# Patient Record
Sex: MALE | Race: WHITE | Employment: OTHER | URBAN - METROPOLITAN AREA
[De-identification: names, ages, dates, MRNs, and addresses within clinical notes are randomized per-mention and may not be internally consistent; named-entity substitution may affect disease eponyms.]

---

## 2018-05-08 PROBLEM — K51.00 ULCERATIVE PANCOLITIS WITHOUT COMPLICATION (HCC): Status: ACTIVE | Noted: 2018-05-08

## 2018-05-17 PROBLEM — E66.01 SEVERE OBESITY (BMI 35.0-39.9) WITH COMORBIDITY (HCC): Status: ACTIVE | Noted: 2018-05-17

## 2018-06-13 ENCOUNTER — ANESTHESIA (OUTPATIENT)
Dept: ENDOSCOPY | Age: 68
End: 2018-06-13
Payer: MEDICARE

## 2018-06-13 ENCOUNTER — ANESTHESIA EVENT (OUTPATIENT)
Dept: ENDOSCOPY | Age: 68
End: 2018-06-13
Payer: MEDICARE

## 2018-06-13 ENCOUNTER — HOSPITAL ENCOUNTER (OUTPATIENT)
Age: 68
Setting detail: OUTPATIENT SURGERY
Discharge: HOME | End: 2018-06-13
Attending: INTERNAL MEDICINE | Admitting: INTERNAL MEDICINE
Payer: MEDICARE

## 2018-06-13 VITALS
WEIGHT: 250 LBS | OXYGEN SATURATION: 97 % | BODY MASS INDEX: 35 KG/M2 | HEART RATE: 66 BPM | HEIGHT: 71 IN | TEMPERATURE: 97.8 F | SYSTOLIC BLOOD PRESSURE: 122 MMHG | RESPIRATION RATE: 12 BRPM | DIASTOLIC BLOOD PRESSURE: 73 MMHG

## 2018-06-13 PROCEDURE — 76040000019: Performed by: INTERNAL MEDICINE

## 2018-06-13 PROCEDURE — 88305 TISSUE EXAM BY PATHOLOGIST: CPT | Performed by: INTERNAL MEDICINE

## 2018-06-13 PROCEDURE — 76060000031 HC ANESTHESIA FIRST 0.5 HR: Performed by: INTERNAL MEDICINE

## 2018-06-13 PROCEDURE — 74011250636 HC RX REV CODE- 250/636

## 2018-06-13 PROCEDURE — 77030027957 HC TBNG IRR ENDOGTR BUSS -B: Performed by: INTERNAL MEDICINE

## 2018-06-13 PROCEDURE — 74011250636 HC RX REV CODE- 250/636: Performed by: INTERNAL MEDICINE

## 2018-06-13 PROCEDURE — 77030009426 HC FCPS BIOP ENDOSC BSC -B: Performed by: INTERNAL MEDICINE

## 2018-06-13 RX ORDER — FENTANYL CITRATE 50 UG/ML
100 INJECTION, SOLUTION INTRAMUSCULAR; INTRAVENOUS
Status: DISCONTINUED | OUTPATIENT
Start: 2018-06-13 | End: 2018-06-13 | Stop reason: HOSPADM

## 2018-06-13 RX ORDER — SODIUM CHLORIDE 9 MG/ML
50 INJECTION, SOLUTION INTRAVENOUS CONTINUOUS
Status: DISCONTINUED | OUTPATIENT
Start: 2018-06-13 | End: 2018-06-13 | Stop reason: HOSPADM

## 2018-06-13 RX ORDER — SODIUM CHLORIDE 0.9 % (FLUSH) 0.9 %
5-10 SYRINGE (ML) INJECTION AS NEEDED
Status: DISCONTINUED | OUTPATIENT
Start: 2018-06-13 | End: 2018-06-13 | Stop reason: HOSPADM

## 2018-06-13 RX ORDER — MIDAZOLAM HYDROCHLORIDE 1 MG/ML
.25-5 INJECTION, SOLUTION INTRAMUSCULAR; INTRAVENOUS
Status: DISCONTINUED | OUTPATIENT
Start: 2018-06-13 | End: 2018-06-13 | Stop reason: HOSPADM

## 2018-06-13 RX ORDER — DEXTROMETHORPHAN/PSEUDOEPHED 2.5-7.5/.8
1.2 DROPS ORAL
Status: DISCONTINUED | OUTPATIENT
Start: 2018-06-13 | End: 2018-06-13 | Stop reason: HOSPADM

## 2018-06-13 RX ORDER — EPINEPHRINE 0.1 MG/ML
1 INJECTION INTRACARDIAC; INTRAVENOUS
Status: DISCONTINUED | OUTPATIENT
Start: 2018-06-13 | End: 2018-06-13 | Stop reason: HOSPADM

## 2018-06-13 RX ORDER — ATROPINE SULFATE 0.1 MG/ML
0.5 INJECTION INTRAVENOUS
Status: DISCONTINUED | OUTPATIENT
Start: 2018-06-13 | End: 2018-06-13 | Stop reason: HOSPADM

## 2018-06-13 RX ORDER — FLUMAZENIL 0.1 MG/ML
0.2 INJECTION INTRAVENOUS
Status: DISCONTINUED | OUTPATIENT
Start: 2018-06-13 | End: 2018-06-13 | Stop reason: HOSPADM

## 2018-06-13 RX ORDER — SODIUM CHLORIDE 9 MG/ML
INJECTION, SOLUTION INTRAVENOUS
Status: DISCONTINUED | OUTPATIENT
Start: 2018-06-13 | End: 2018-06-13 | Stop reason: HOSPADM

## 2018-06-13 RX ORDER — NALOXONE HYDROCHLORIDE 0.4 MG/ML
0.4 INJECTION, SOLUTION INTRAMUSCULAR; INTRAVENOUS; SUBCUTANEOUS
Status: DISCONTINUED | OUTPATIENT
Start: 2018-06-13 | End: 2018-06-13 | Stop reason: HOSPADM

## 2018-06-13 RX ORDER — SODIUM CHLORIDE 0.9 % (FLUSH) 0.9 %
5-10 SYRINGE (ML) INJECTION EVERY 8 HOURS
Status: DISCONTINUED | OUTPATIENT
Start: 2018-06-13 | End: 2018-06-13 | Stop reason: HOSPADM

## 2018-06-13 RX ORDER — PROPOFOL 10 MG/ML
INJECTION, EMULSION INTRAVENOUS AS NEEDED
Status: DISCONTINUED | OUTPATIENT
Start: 2018-06-13 | End: 2018-06-13 | Stop reason: HOSPADM

## 2018-06-13 RX ADMIN — PROPOFOL 50 MG: 10 INJECTION, EMULSION INTRAVENOUS at 09:15

## 2018-06-13 RX ADMIN — PROPOFOL 50 MG: 10 INJECTION, EMULSION INTRAVENOUS at 09:09

## 2018-06-13 RX ADMIN — PROPOFOL 50 MG: 10 INJECTION, EMULSION INTRAVENOUS at 09:12

## 2018-06-13 RX ADMIN — PROPOFOL 50 MG: 10 INJECTION, EMULSION INTRAVENOUS at 09:03

## 2018-06-13 RX ADMIN — PROPOFOL 50 MG: 10 INJECTION, EMULSION INTRAVENOUS at 09:02

## 2018-06-13 RX ADMIN — SODIUM CHLORIDE: 9 INJECTION, SOLUTION INTRAVENOUS at 08:51

## 2018-06-13 RX ADMIN — PROPOFOL 50 MG: 10 INJECTION, EMULSION INTRAVENOUS at 09:06

## 2018-06-13 NOTE — ANESTHESIA PREPROCEDURE EVALUATION
Anesthetic History   No history of anesthetic complications            Review of Systems / Medical History  Patient summary reviewed, nursing notes reviewed and pertinent labs reviewed    Pulmonary            Asthma        Neuro/Psych   Within defined limits           Cardiovascular    Hypertension                   GI/Hepatic/Renal  Within defined limits              Endo/Other        Obesity and arthritis     Other Findings              Physical Exam    Airway  Mallampati: II  TM Distance: > 6 cm  Neck ROM: normal range of motion   Mouth opening: Normal     Cardiovascular  Regular rate and rhythm,  S1 and S2 normal,  no murmur, click, rub, or gallop             Dental  No notable dental hx       Pulmonary  Breath sounds clear to auscultation               Abdominal  GI exam deferred       Other Findings            Anesthetic Plan    ASA: 2  Anesthesia type: MAC          Induction: Intravenous  Anesthetic plan and risks discussed with: Patient

## 2018-06-13 NOTE — IP AVS SNAPSHOT
[address] [facility] 13 
[phone] Patient: Anahy Munoz MRN: [id number] :1950 About your hospitalization You were admitted on:  2018 You last received care in the:  [facility] ENDOSCOPY You were discharged on:  2018 Why you were hospitalized Your primary diagnosis was:  Not on File Follow-up Information None Discharge Orders None A check nasrin indicates which time of day the medication should be taken. My Medications CONTINUE taking these medications Instructions Each Dose to Equal  
 Morning Noon Evening Bedtime  
 dorzolamide 2 % ophthalmic solution Commonly known as:  TRUSOPT Your last dose was: Your next dose is:    
   
   
 Administer 1 drop to both eyes two (2) times a day. 1 Drop  
    
   
   
   
  
 hydroCHLOROthiazide 25 mg tablet Commonly known as:  HYDRODIURIL Your last dose was: Your next dose is: TAKE 1 TABLET EVERY DAY  
     
   
   
   
  
 HYDROcodone-acetaminophen 5-325 mg per tablet Commonly known as:  Sam Ribera Your last dose was: Your next dose is: Take 1 Tab by mouth every four (4) hours as needed for Pain. Max Daily Amount: 6 Tabs. 1 Tab POTASSIUM CHLORIDE PO Your last dose was: Your next dose is: Take  by mouth two (2) times a day. Take 20 meq qd SEREVENT DISKUS 50 mcg/dose Dsdv Generic drug:  salmeterol Your last dose was: Your next dose is:    
   
   
 INHALE 1 PUFF TWICE DAILY  
     
   
   
   
  
 sulfaSALAzine 500 mg tablet Commonly known as:  AZULFIDINE Your last dose was: Your next dose is: Take 500 mg by mouth four (4) times daily. 500 mg  
    
   
   
   
  
 XALATAN 0.005 % ophthalmic solution Generic drug:  latanoprost  
   
 Your last dose was: Your next dose is:    
   
   
 Administer 1 Drop to both eyes nightly. 1 Drop Opioid Education Prescription Opioids: What You Need to Know: 
 
Prescription opioids can be used to help relieve moderate-to-severe pain and are often prescribed following a surgery or injury, or for certain health conditions. These medications can be an important part of treatment but also come with serious risks. Opioids are strong pain medicines. Examples include hydrocodone, oxycodone, fentanyl, and morphine. Heroin is an example of an illegal opioid. It is important to work with your health care provider to make sure you are getting the safest, most effective care. WHAT ARE THE RISKS AND SIDE EFFECTS OF OPIOID USE? Prescription opioids carry serious risks of addiction and overdose, especially with prolonged use. An opioid overdose, often marked by slow breathing, can cause sudden death. The use of prescription opioids can have a number of side effects as well, even when taken as directed. · Tolerance-meaning you might need to take more of a medication for the same pain relief · Physical dependence-meaning you have symptoms of withdrawal when the medication is stopped. Withdrawal symptoms can include nausea, sweating, chills, diarrhea, stomach cramps, and muscle aches. Withdrawal can last up to several weeks, depending on which drug you took and how long you took it. · Increased sensitivity to pain · Constipation · Nausea, vomiting, and dry mouth · Sleepiness and dizziness · Confusion · Depression · Low levels of testosterone that can result in lower sex drive, energy, and strength · Itching and sweating RISKS ARE GREATER WITH:      
· History of drug misuse, substance use disorder, or overdose · Mental health conditions (such as depression or anxiety) · Sleep apnea · Older age (72 years or older) · Pregnancy Avoid alcohol while taking prescription opioids. Also, unless specifically advised by your health care provider, medications to avoid include: · Benzodiazepines (such as Xanax or Valium) · Muscle relaxants (such as Soma or Flexeril) · Hypnotics (such as Ambien or Lunesta) · Other prescription opioids KNOW YOUR OPTIONS Talk to your health care provider about ways to manage your pain that don't involve prescription opioids. Some of these options may actually work better and have fewer risks and side effects. Options may include: 
· Pain relievers such as acetaminophen, ibuprofen, and naproxen · Some medications that are also used for depression or seizures · Physical therapy and exercise · Counseling to help patients learn how to cope better with triggers of pain and stress. · Application of heat or cold compress · Massage therapy · Relaxation techniques Be Informed Make sure you know the name of your medication, how much and how often to take it, and its potential risks & side effects. IF YOU ARE PRESCRIBED OPIOIDS FOR PAIN: 
· Never take opioids in greater amounts or more often than prescribed. Remember the goal is not to be pain-free but to manage your pain at a tolerable level. · Follow up with your primary care provider to: · Work together to create a plan on how to manage your pain. · Talk about ways to help manage your pain that don't involve prescription opioids. · Talk about any and all concerns and side effects. · Help prevent misuse and abuse. · Never sell or share prescription opioids · Help prevent misuse and abuse. · Store prescription opioids in a secure place and out of reach of others (this may include visitors, children, friends, and family).  
· Safely dispose of unused/unwanted prescription opioids: Find your community drug take-back program or your pharmacy mail-back program, or flush them down the toilet, following guidance from the Food and Drug Administration (www.fda.gov/Drugs/ResourcesForYou). · Visit www.cdc.gov/drugoverdose to learn about the risks of opioid abuse and overdose. · If you believe you may be struggling with addiction, tell your health care provider and ask for guidance or call Nuzhat Curtis at 8-032-741-PTWP. Discharge Instructions [address] Josie Willett [id number] 
1950 COLON DISCHARGE INSTRUCTIONS DISCOMFORT: 
Redness at IV site- apply warm compress to area; if redness or soreness persist- contact your physician There may be a slight amount of blood passed from the rectum Gaseous discomfort- walking, belching will help relieve any discomfort You may not operate a vehicle for 12 hours You may not engage in an occupation involving machinery or appliances for rest of today You may not drink alcoholic beverages for at least 12 hours Avoid making any critical decisions for at least 24 hour DIET: 
 Regular diet.  however -  remember your colon is empty and a heavy meal will produce gas. Avoid these foods:  vegetables, fried / greasy foods, carbonated drinks for today ACTIVITY: 
You may resume your normal daily activities it is recommended that you spend the remainder of the day resting -  avoid any strenuous activity. CALL M.D. ANY SIGN OF: Increasing pain, nausea, vomiting Abdominal distension (swelling) New increased bleeding (oral or rectal) Fever (chills) Pain in chest area Bloody discharge from nose or mouth Shortness of breath Follow-up Instructions: 
 Call Dr. Mateo Recinos for any questions or problems. Telephone # [phone] Biopsy results will be available in  5 to 7 days Should have a repeat colonoscopy in 3 years Impression:  1.- Ulcerative Colitis, very mild changes Introducing [facility]! [facility] introduces [employer] patient portal. Now you can access parts of your medical record, email your doctor's office, and request medication refills online. 1. In your internet browser, go to [email or web address] 2. Click on the First Time User? Click Here link in the Sign In box. You will see the New Member Sign Up page. 3. Enter your [employer] Access Code exactly as it appears below. You will not need to use this code after youve completed the sign-up process. If you do not sign up before the expiration date, you must request a new code. · [employer] Access Code: [email or web address] Expires: 8/6/2018  9:37 AM 
 
4. Enter the last four digits of your Social Security Number (xxxx) and Date of Birth (mm/dd/yyyy) as indicated and click Submit. You will be taken to the next sign-up page. 5. Create a Dayforce ID. This will be your Dayforce login ID and cannot be changed, so think of one that is secure and easy to remember. 6. Create a [employer]force password. You can change your password at any time. 7. Enter your Password Reset Question and Answer. This can be used at a later time if you forget your password. 8. Enter your e-mail address. You will receive e-mail notification when new information is available in [address] 9. Click Sign Up. You can now view and download portions of your medical record. 10. Click the Download Summary menu link to download a portable copy of your medical information. If you have questions, please visit the Frequently Asked Questions section of the [employer] website. Remember, [employer] is NOT to be used for urgent needs. For medical emergencies, dial 911. Now available from your iPhone and Android! Introducing Austen Sheikh As a [facility] patient, I wanted to make you aware of our electronic visit tool called Austen Sheikh. [facility] 24/7 allows you to connect within minutes with a medical provider 24 hours a day, seven days a week via a mobile device or tablet or logging into a secure website from your computer. You can access [employer] from anywhere in the [place]. A virtual visit might be right for you when you have a simple condition and feel like you just dont want to get out of bed, or cant get away from work for an appointment, when your regular [facility] provider is not available (evenings, weekends or holidays), or when youre out of town and need minor care. Electronic visits cost only $49 and if the [employer] 24/7 provider determines a prescription is needed to treat your condition, one can be electronically transmitted to a nearby pharmacy*. Please take a moment to enroll today if you have not already done so. The enrollment process is free and takes just a few minutes. To enroll, please download the [employer] 24/7 lilibeth to your tablet or phone, or visit [email or web address] to enroll on your computer. And, as an [address] [place] [address] patient with a [employer] account, the results of your visits will be scanned into your electronic medical record and your primary care provider will be able to view the scanned results. We urge you to continue to see your regular [facility] provider for your ongoing medical care. And while your primary care provider may not be the one available when you seek a [employer] virtual visit, the peace of mind you get from getting a real diagnosis real time can be priceless. For more information on [employer], view our Frequently Asked Questions (FAQs) at [email or web address]. Sincerely, 
 
Vero Otero MD 
Chief Medical Officer Morelia Lorelei Garcia *:  certain medications cannot be prescribed via [employer] Providers Seen During Your Hospitalization Provider Specialty Primary office phone Yeyo Diaz MD Gastroenterology [phone] Your Primary Care Physician (PCP) Primary Care Physician Office Phone Office Fax Larry Shaw [phone] You are allergic to the following Allergen Reactions Adhesive Rash Cephalexin Unknown (comments) Unsure of reaction Pcn (Penicillins) Rash Zithromax (Azithromycin) Rash Recent Documentation Height Weight BMI Smoking Status 1.791 m 113.4 kg 35.36 kg/m2 Never Smoker Emergency Contacts Name Discharge Info Relation Home Work Mobile Olman Perrin CAREGIVER [3] Spouse [3] [phone] Patient Belongings The following personal items are in your possession at time of discharge: 
  Dental Appliances: None  Visual Aid: None Please provide this summary of care documentation to your next provider. Signatures-by signing, you are acknowledging that this After Visit Summary has been reviewed with you and you have received a copy. Patient Signature:  ____________________________________________________________ Date:  ____________________________________________________________  
  
Janicea Monticello Provider Signature:  ____________________________________________________________ Date:  ____________________________________________________________

## 2018-06-13 NOTE — ROUTINE PROCESS
Edil Shall  1950  [id number]    Situation:  Verbal report received from: Ashlee Dumont  Procedure: Procedure(s):  COLONOSCOPY  COLON BIOPSY    Background:    Preoperative diagnosis: FAMILY HISTORY OF COLON CANCER AND PERSONAL HISTORY OF COLON POLYPS  Postoperative diagnosis: 1.- Ulcerative Colitis    :  Dr. Maximo Messina  Assistant(s): Endoscopy Technician-1: Stanislav Fontanez  Endoscopy RN-1: Radha Cisse RN    Specimens:   ID Type Source Tests Collected by Time Destination   1 : Cecal Ascending Colon Biopsy Preservative   Kendell Tavares MD 6/13/2018 0914 Pathology   2 : Hepatic Transverse Colon Biopsy Pressamirative   Kendell Tavares MD 6/13/2018 0507 Pathology   3 : Splenic Descending Colon Biopsy Pressamirative   Kendell Tavares MD 6/13/2018 0620 Pathology   4 : Sigmoid Rectal Biopsy Preservative   Kendell Tavares MD 6/13/2018 0919 Pathology     H. Pylori  no    Assessment:  Intra-procedure medications     Anesthesia gave intra-procedure sedation and medications, see anesthesia flow sheet yes    Intravenous fluids: NS@ KVO     Vital signs stable     Abdominal assessment: round and soft     Recommendation:  Discharge patient per MD order.     Family or Friend   Permission to share finding with family or friend yes

## 2018-06-13 NOTE — ANESTHESIA POSTPROCEDURE EVALUATION
Post-Anesthesia Evaluation and Assessment    Patient: Ramona Reyse MRN: [id number]  SSN: [id number]    YOB: 1950  Age: 77 y.o. Sex: male       Cardiovascular Function/Vital Signs  Visit Vitals    /73    Pulse 66    Temp 36.6 °C (97.8 °F)    Resp 12    Ht 5' 10.5\" (1.791 m)    Wt 113.4 kg (250 lb)    SpO2 97%    BMI 35.36 kg/m2       Patient is status post MAC anesthesia for Procedure(s):  COLONOSCOPY  COLON BIOPSY. Nausea/Vomiting: None    Postoperative hydration reviewed and adequate. Pain:  Pain Scale 1: Numeric (0 - 10) (06/13/18 0952)  Pain Intensity 1: 0 (06/13/18 9224)   Managed    Neurological Status: At baseline    Mental Status and Level of Consciousness: Arousable    Pulmonary Status:   O2 Device: Room air (06/13/18 0952)   Adequate oxygenation and airway patent    Complications related to anesthesia: None    Post-anesthesia assessment completed.  No concerns    Signed By: Nhi Manriquez MD     June 13, 2018

## 2018-06-13 NOTE — DISCHARGE INSTRUCTIONS
[address]  [facility], [address]          [facility]  [id number]  1950    COLON DISCHARGE INSTRUCTIONS    DISCOMFORT:  Redness at IV site- apply warm compress to area; if redness or soreness persist- contact your physician  There may be a slight amount of blood passed from the rectum  Gaseous discomfort- walking, belching will help relieve any discomfort  You may not operate a vehicle for 12 hours  You may not engage in an occupation involving machinery or appliances for rest of today  You may not drink alcoholic beverages for at least 12 hours  Avoid making any critical decisions for at least 24 hour  DIET:   Regular diet. - however -  remember your colon is empty and a heavy meal will produce gas. Avoid these foods:  vegetables, fried / greasy foods, carbonated drinks for today         ACTIVITY:  You may resume your normal daily activities it is recommended that you spend the remainder of the day resting -  avoid any strenuous activity. CALL M.D. ANY SIGN OF:   Increasing pain, nausea, vomiting  Abdominal distension (swelling)  New increased bleeding (oral or rectal)  Fever (chills)  Pain in chest area  Bloody discharge from nose or mouth  Shortness of breath     Follow-up Instructions:   Call Dr. Saeed Douglass for any questions or problems.    Telephone # [phone]  Biopsy results will be available in  5 to 7 days  Should have a repeat colonoscopy in 3 years    Impression:  1.- Ulcerative Colitis, very mild changes

## 2018-06-13 NOTE — PROGRESS NOTES

## 2018-06-13 NOTE — H&P
[address]                 Payal Reddy is a  77 y.o.  male who presents with long standing ulcerative colitis for screening .         Past Medical History:   Diagnosis Date    Arthritis     Asthma     Basal cell carcinoma of back     Cancer (HCC)     SKIN CANCERS-BASAL    Chronic pain     back    Hypertension     Other ill-defined conditions(799.89)     ulcerative cholitis    Other ill-defined conditions(799.89)     ruptured l3-4    Other ill-defined conditions(799.89)     spinal stensis    Ulcerative pancolitis without complication ([address] 8/6/4366     Past Surgical History:   Procedure Laterality Date    ABDOMEN SURGERY PROC UNLISTED  1983    hernia repair    HX COLONOSCOPY      HX HEENT      TONSILS AS A CHILD    HX HEENT      CATARACTS    HX ORTHOPAEDIC  4/70    fussion- l45    HX ORTHOPAEDIC  2015    rods placed [facility] ortho     Allergies   Allergen Reactions    Adhesive Rash    Cephalexin Unknown (comments)     Unsure of reaction    Pcn [Penicillins] Rash    Zithromax [Azithromycin] Rash     Current Facility-Administered Medications   Medication Dose Route Frequency Provider Last Rate Last Dose    0.9% sodium chloride infusion  50 mL/hr IntraVENous CONTINUOUS Lissa Flores MD        sodium chloride (NS) flush 5-10 mL  5-10 mL IntraVENous Q8H Lissa Flores MD        sodium chloride (NS) flush 5-10 mL  5-10 mL IntraVENous PRN Lissa Flores MD        midazolam (VERSED) injection 0.25-5 mg  0.25-5 mg IntraVENous Simi Flores MD        fentaNYL citrate (PF) injection 100 mcg  100 mcg IntraVENous Simi Flores MD        naloxone [facility]) injection 0.4 mg  0.4 mg IntraVENous Simi Flores MD        flumazenil (ROMAZICON) 0.1 mg/mL injection 0.2 mg  0.2 mg IntraVENous Simi Flores MD        [facility]) 60LR/6.0XZ oral drops 80 mg  1.2 mL Oral Multiple Jessenia Dolan MD        atropine injection 0.5 mg  0.5 mg IntraVENous ONCE PRN Jessenia Dolan MD        EPINEPHrine (ADRENALIN) 0.1 mg/mL syringe 1 mg  1 mg Endoscopically ONCE PRN Jessenia Dolan MD         Facility-Administered Medications Ordered in Other Encounters   Medication Dose Route Frequency Provider Last Rate Last Dose    0.9% sodium chloride infusion   IntraVENous CONTINUOUS Maybelle Late, CRNA           Visit Vitals    /90    Pulse 90    Temp 98.3 °F (36.8 °C)    Resp 15    Ht 5' 10.5\" (1.791 m)    Wt 113.4 kg (250 lb)    SpO2 96%    BMI 35.36 kg/m2           PHYSICAL EXAM:  General: WD, WN. Alert, cooperative, no acute distress    HEENT: NC, Atraumatic. PERRLA, EOMI. Anicteric sclerae. Mallampati score 2  Lungs:  CTA Bilaterally. No Wheezing/Rhonchi/Rales. Heart:  Regular  rhythm,  No murmur (), No Rubs, No Gallops  Abdomen: Soft, Non distended, Non tender.  +Bowel sounds, no HSM  Extremities: No c/c/e  Neurologic:  CN 2-12 gi, Alert and oriented X 3. No acute neurological distress   Psych:   Good insight. Not anxious nor agitated. Plan:   Endoscopic procedure with MAC.     Jessenia Dolan MD  6/13/2018  9:02 AM

## 2018-10-25 ENCOUNTER — HOSPITAL ENCOUNTER (OUTPATIENT)
Dept: GENERAL RADIOLOGY | Age: 68
Discharge: HOME | End: 2018-10-25
Attending: FAMILY MEDICINE
Payer: MEDICARE

## 2018-10-25 DIAGNOSIS — R07.9 RIGHT-SIDED CHEST PAIN: ICD-10-CM

## 2018-10-25 PROCEDURE — 71046 X-RAY EXAM CHEST 2 VIEWS: CPT

## 2019-01-09 ENCOUNTER — APPOINTMENT (OUTPATIENT)
Dept: GENERAL RADIOLOGY | Age: 69
End: 2019-01-09
Attending: PHYSICIAN ASSISTANT
Payer: MEDICARE

## 2019-01-09 ENCOUNTER — HOSPITAL ENCOUNTER (EMERGENCY)
Age: 69
Discharge: HOME | End: 2019-01-09
Attending: EMERGENCY MEDICINE
Payer: MEDICARE

## 2019-01-09 ENCOUNTER — APPOINTMENT (OUTPATIENT)
Dept: CT IMAGING | Age: 69
End: 2019-01-09
Attending: PHYSICIAN ASSISTANT
Payer: MEDICARE

## 2019-01-09 VITALS
OXYGEN SATURATION: 96 % | TEMPERATURE: 98.4 F | DIASTOLIC BLOOD PRESSURE: 95 MMHG | HEART RATE: 76 BPM | SYSTOLIC BLOOD PRESSURE: 168 MMHG | RESPIRATION RATE: 18 BRPM

## 2019-01-09 DIAGNOSIS — S50.01XA CONTUSION OF RIGHT ELBOW, INITIAL ENCOUNTER: ICD-10-CM
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
## 2019-01-09 DIAGNOSIS — S20.211A CONTUSION OF RIB ON RIGHT SIDE, INITIAL ENCOUNTER: ICD-10-CM

## 2019-01-09 DIAGNOSIS — S80.01XA CONTUSION OF RIGHT KNEE, INITIAL ENCOUNTER: ICD-10-CM

## 2019-01-09 PROCEDURE — 71101 X-RAY EXAM UNILAT RIBS/CHEST: CPT

## 2019-01-09 PROCEDURE — 71250 CT THORAX DX C-: CPT

## 2019-01-09 PROCEDURE — 74011000250 HC RX REV CODE- 250: Performed by: PHYSICIAN ASSISTANT

## 2019-01-09 PROCEDURE — 73080 X-RAY EXAM OF ELBOW: CPT

## 2019-01-09 PROCEDURE — 73562 X-RAY EXAM OF KNEE 3: CPT

## 2019-01-09 PROCEDURE — 99283 EMERGENCY DEPT VISIT LOW MDM: CPT

## 2019-01-09 RX ORDER — LIDOCAINE 50 MG/G
1 PATCH TOPICAL EVERY 24 HOURS
Status: DISCONTINUED | OUTPATIENT
Start: 2019-01-09 | End: 2019-01-09 | Stop reason: HOSPADM

## 2019-01-09 NOTE — SENIOR SERVICES NOTE
Physical Therapy Screening: 
Physical Therapy consult is not indicated at this time. A screening was performed on this patient's chart based on their entrance into the emergency department and potential need for physical therapy identified. The patients chart was reviewed and the patient was interviewed/screened. A physical therapy consult is not indicated at this time based on their prior level of function and current medical status. Please consult physical therapy if any therapy needs arise. Thank you.

## 2019-01-09 NOTE — DISCHARGE INSTRUCTIONS
Patient Education      - return for new or worsening symptoms  - make a follow up with your primary care  - over the counter medications as needed for pain  Bruises: Care Instructions  Your Care Instructions    Bruises occur when small blood vessels under the skin tear or rupture, most often from a twist, bump, or fall. Blood leaks into tissues under the skin and causes a black-and-blue spot that often turns colors, including purplish black, reddish blue, or yellowish green, as the bruise heals. Bruises hurt, but most are not serious and will go away on their own within 2 to 4 weeks. Sometimes, gravity causes them to spread down the body. A leg bruise usually will take longer to heal than a bruise on the face or arms. Follow-up care is a key part of your treatment and safety. Be sure to make and go to all appointments, and call your doctor if you are having problems. It's also a good idea to know your test results and keep a list of the medicines you take. How can you care for yourself at home? · Take pain medicines exactly as directed. ? If the doctor gave you a prescription medicine for pain, take it as prescribed. ? If you are not taking a prescription pain medicine, ask your doctor if you can take an over-the-counter medicine. · Put ice or a cold pack on the area for 10 to 20 minutes at a time. Put a thin cloth between the ice and your skin. · If you can, prop up the bruised area on pillows as much as possible for the next few days. Try to keep the bruise above the level of your heart. When should you call for help? Call your doctor now or seek immediate medical care if:    · You have signs of infection, such as:  ? Increased pain, swelling, warmth, or redness. ? Red streaks leading from the bruise. ? Pus draining from the bruise. ?  A fever.     · You have a bruise on your leg and signs of a blood clot, such as:  ? Increasing redness and swelling along with warmth, tenderness, and pain in the bruised area. ? Pain in your calf, back of the knee, thigh, or groin. ? Redness and swelling in your leg or groin.     · Your pain gets worse.    Watch closely for changes in your health, and be sure to contact your doctor if:    · You do not get better as expected. Where can you learn more? Go to http://montez-joseph.info/. Enter [phone] in the search box to learn more about \"Bruises: Care Instructions. \"  Current as of: November 20, 2017  Content Version: 11.8  © 8116-8196 [employer]. Care instructions adapted under license by [employer] (which disclaims liability or warranty for this information). If you have questions about a medical condition or this instruction, always ask your healthcare professional. [place] [address] any warranty or liability for your use of this information. Patient Education        Chest Contusion: Care Instructions  Your Care Instructions  A chest contusion, or bruise, is caused by a fall or direct blow to the chest. Car crashes, falls, getting punched, and injury from bicycle handlebars are common causes of chest contusions. A very forceful blow to the chest can injure the heart or blood vessels in the chest, the lungs, the airway, the liver, or the spleen. Pain may be caused by an injury to muscles, cartilage, or ribs. Deep breathing, coughing, or sneezing can increase your pain. Lying on the injured area also can cause pain. Follow-up care is a key part of your treatment and safety. Be sure to make and go to all appointments, and call your doctor if you are having problems. It's also a good idea to know your test results and keep a list of the medicines you take. How can you care for yourself at home? · Rest and protect the injured or sore area. Stop, change, or take a break from any activity that may be causing your pain. · Put ice or a cold pack on the area for 10 to 20 minutes at a time.  Put a thin cloth between the ice and your skin. · After 2 or 3 days, if your swelling is gone, apply a heating pad set on low or a warm cloth to your chest. Some doctors suggest that you go back and forth between hot and cold. Put a thin cloth between the heating pad and your skin. · Do not wrap or tape your ribs for support. This may cause you to take smaller breaths, which could increase your risk of pneumonia and lung collapse. · Ask your doctor if you can take an over-the-counter pain medicine, such as acetaminophen (Tylenol), ibuprofen (Advil, Motrin), or naproxen (Aleve). Be safe with medicines. Read and follow all instructions on the label. · Take your medicines exactly as prescribed. Call your doctor if you think you are having a problem with your medicine. · Gentle stretching and massage may help you feel better after a few days of rest. Stretch slowly to the point just before discomfort begins, then hold the stretch for at least 15 to 30 seconds. Do this 3 or 4 times per day. · As your pain gets better, slowly return to your normal activities. Be patient, because chest bruises can take weeks or months to heal. Any increased pain may be a sign that you need to rest a while longer. When should you call for help? Call 911 anytime you think you may need emergency care. For example, call if:    · You have severe trouble breathing.     · You cough up blood.    Call your doctor now or seek immediate medical care if:    · You have belly pain.     · You are dizzy or lightheaded, or you feel like you may faint.     · You develop new symptoms with the chest pain.     · Your chest pain gets worse.     · You have a fever.     · You have some shortness of breath.     · You have a cough that brings up mucus from the lungs.    Watch closely for changes in your health, and be sure to contact your doctor if:    · Your chest pain is not improving after 1 week. Where can you learn more?   Go to http://montez-joseph.info/. Enter I174 in the search box to learn more about \"Chest Contusion: Care Instructions. \"  Current as of: November 20, 2017  Content Version: 11.8  © 7624-2378 [employer]. Care instructions adapted under license by [employer] (which disclaims liability or warranty for this information). If you have questions about a medical condition or this instruction, always ask your healthcare professional. [place] [address] any warranty or liability for your use of this information.

## 2019-01-09 NOTE — ED PROVIDER NOTES
77year old male presenting to the ED for a fall. Pt notes that the was outside, tripped over a root and fell onto his RIGHT side. Fall occurred at about 11:30AM.  Pt notes that he skinned his knee, his right elbow, and right ribs. Notes that the most pain is in the right ribs, just under the right nippl, sharp, moderately severe, worse with movement and palpation. Denies abdominal pain. Pt notes that it hurts to take a deep breath. No head trauma or LOC. No anticoagulant use. PMHx: asthma, HTN, UC Social:  Past Medical History:  
Diagnosis Date  Arthritis  Asthma  Basal cell carcinoma of back  Cancer ([address] SKIN CANCERS-BASAL  Chronic pain   
 back  Hypertension  Other ill-defined conditions(799.89)   
 ulcerative cholitis  Other ill-defined conditions(799.89) ruptured l3-4  Other ill-defined conditions(799.89)   
 spinal stensis  Ulcerative pancolitis without complication ([address] 0/2/8456 Past Surgical History:  
Procedure Laterality Date  ABDOMEN SURGERY PROC UNLISTED  1983  
 hernia repair  COLONOSCOPY N/A 6/13/2018 COLONOSCOPY performed by Mary Lou Blunt MD at [facility] HX COLONOSCOPY    
 HX HEENT    
 TONSILS AS A CHILD  
 HX HEENT    
 CATARACTS  
 HX ORTHOPAEDIC  4/70  
 fussion- Radonna Tutu  HX ORTHOPAEDIC  2015  
 rods placed [facility] ortho Family History:  
Problem Relation Age of Onset  Hypertension Mother  Heart Disease Mother CHF  Stroke Mother  Inflammatory Bowel Dz Mother  Osteoporosis Mother  Hypertension Father  Parkinsonism Father  Asthma Brother  Hypertension Brother  Psychiatric Disorder Brother ANXIETY  Asthma Brother  Cancer Paternal Grandfather   
     colon  Cancer Other   
     stomach cancer - mother's grandfather  Anesth Problems Neg Hx Social History Socioeconomic History  Marital status:   
 Spouse name: Not on file  Number of children: Not on file  Years of education: Not on file  Highest education level: Not on file Social Needs  Financial resource strain: Not on file  Food insecurity - worry: Not on file  Food insecurity - inability: Not on file  Transportation needs - medical: Not on file  Transportation needs - non-medical: Not on file Occupational History  Not on file Tobacco Use  Smoking status: Never Smoker  Smokeless tobacco: Never Used Substance and Sexual Activity  Alcohol use: No  
 Drug use: No  
 Sexual activity: Not on file Other Topics Concern  Not on file Social History Narrative  Not on file ALLERGIES: Adhesive; Cephalexin; Pcn [penicillins]; and Zithromax [azithromycin] Review of Systems Constitutional: Negative for fever. HENT: Negative for congestion. Eyes: Negative for discharge and redness. Respiratory: Negative for cough and shortness of breath. Cardiovascular: Positive for chest pain (chest wall). Gastrointestinal: Negative for diarrhea and vomiting. Genitourinary: Negative for difficulty urinating. Musculoskeletal: Positive for arthralgias. Skin: Positive for wound (abrasions). Neurological: Negative for syncope. All other systems reviewed and are negative. Vitals:  
 01/09/19 1224 Pulse: 87 SpO2: 98% Physical Exam  
Constitutional: He appears well-developed and well-nourished. No distress. Pleasant WM  
HENT:  
Right Ear: External ear normal.  
Left Ear: External ear normal.  
Eyes: Pupils are equal, round, and reactive to light. Neck: Normal range of motion. Neck supple. Cardiovascular: Normal rate, regular rhythm and normal heart sounds. Exam reveals no gallop and no friction rub. No murmur heard. Pulmonary/Chest: Effort normal and breath sounds normal. No respiratory distress. He has no wheezes. Abdominal: Soft. There is no tenderness. There is no guarding. Abdomen benign Musculoskeletal: Normal range of motion. Abrasions to the right elbow and right knee, normal ROM, NVID Neurological: He is alert. Skin: Skin is warm and dry. Psychiatric: He has a normal mood and affect. Nursing note and vitals reviewed. MDM Number of Diagnoses or Management Options Diagnosis management comments: 77year old male presenting for right rib, elbow, knee pain after mechanical fall. No head trauma or LOC. No fx on XR or CT. Encouraged supportive care at home, PCP f/u. Amount and/or Complexity of Data Reviewed Tests in the radiology section of CPT®: ordered and reviewed Discuss the patient with other providers: yes (Dr. Sotero Sandoval, ED attending) Procedures

## 2019-01-09 NOTE — ED TRIAGE NOTES
C/o tripping over root and fell onto right side. C/o Right flank pain, right elbow pain and right knee pain. Painful breathing. Abrasion to Right posterior forearm

## 2020-09-29 ENCOUNTER — APPOINTMENT (OUTPATIENT)
Dept: CT IMAGING | Age: 70
End: 2020-09-29
Attending: EMERGENCY MEDICINE
Payer: MEDICARE

## 2020-09-29 ENCOUNTER — HOSPITAL ENCOUNTER (EMERGENCY)
Age: 70
Discharge: HOME | End: 2020-09-29
Attending: EMERGENCY MEDICINE
Payer: MEDICARE

## 2020-09-29 VITALS
RESPIRATION RATE: 20 BRPM | SYSTOLIC BLOOD PRESSURE: 156 MMHG | OXYGEN SATURATION: 97 % | DIASTOLIC BLOOD PRESSURE: 71 MMHG | HEIGHT: 71 IN | HEART RATE: 92 BPM | BODY MASS INDEX: 35 KG/M2 | TEMPERATURE: 97.2 F | WEIGHT: 250 LBS

## 2020-09-29 DIAGNOSIS — K08.89 PAIN, DENTAL: ICD-10-CM

## 2020-09-29 DIAGNOSIS — R22.0 RIGHT FACIAL SWELLING: Primary | ICD-10-CM

## 2020-09-29 LAB
ALBUMIN SERPL-MCNC: 3.5 G/DL (ref 3.5–5)
ALBUMIN/GLOB SERPL: 0.6 {RATIO} (ref 1.1–2.2)
ALP SERPL-CCNC: 65 U/L (ref 45–117)
ALT SERPL-CCNC: 54 U/L (ref 12–78)
ANION GAP SERPL CALC-SCNC: 7 MMOL/L (ref 5–15)
AST SERPL-CCNC: 49 U/L (ref 15–37)
BASOPHILS # BLD: 0.1 K/UL (ref 0–0.1)
BASOPHILS NFR BLD: 1 % (ref 0–1)
BILIRUB SERPL-MCNC: 0.4 MG/DL (ref 0.2–1)
BUN SERPL-MCNC: 17 MG/DL (ref 6–20)
BUN/CREAT SERPL: 15 (ref 12–20)
CALCIUM SERPL-MCNC: 9.9 MG/DL (ref 8.5–10.1)
CHLORIDE SERPL-SCNC: 98 MMOL/L (ref 97–108)
CO2 SERPL-SCNC: 26 MMOL/L (ref 21–32)
CREAT SERPL-MCNC: 1.11 MG/DL (ref 0.7–1.3)
DIFFERENTIAL METHOD BLD: NORMAL
EOSINOPHIL # BLD: 0.1 K/UL (ref 0–0.4)
EOSINOPHIL NFR BLD: 1 % (ref 0–7)
ERYTHROCYTE [DISTWIDTH] IN BLOOD BY AUTOMATED COUNT: 11.9 % (ref 11.5–14.5)
GLOBULIN SER CALC-MCNC: 5.4 G/DL (ref 2–4)
GLUCOSE SERPL-MCNC: 247 MG/DL (ref 65–100)
HCT VFR BLD AUTO: 40.8 % (ref 36.6–50.3)
HGB BLD-MCNC: 13.9 G/DL (ref 12.1–17)
IMM GRANULOCYTES # BLD AUTO: 0 K/UL (ref 0–0.04)
IMM GRANULOCYTES NFR BLD AUTO: 0 % (ref 0–0.5)
LYMPHOCYTES # BLD: 2.3 K/UL (ref 0.8–3.5)
LYMPHOCYTES NFR BLD: 25 % (ref 12–49)
MCH RBC QN AUTO: 32 PG (ref 26–34)
MCHC RBC AUTO-ENTMCNC: 34.1 G/DL (ref 30–36.5)
MCV RBC AUTO: 93.8 FL (ref 80–99)
MONOCYTES # BLD: 0.7 K/UL (ref 0–1)
MONOCYTES NFR BLD: 8 % (ref 5–13)
NEUTS SEG # BLD: 6 K/UL (ref 1.8–8)
NEUTS SEG NFR BLD: 65 % (ref 32–75)
NRBC # BLD: 0 K/UL (ref 0–0.01)
NRBC BLD-RTO: 0 PER 100 WBC
PLATELET # BLD AUTO: 259 K/UL (ref 150–400)
PMV BLD AUTO: 9.9 FL (ref 8.9–12.9)
POTASSIUM SERPL-SCNC: 3.8 MMOL/L (ref 3.5–5.1)
PROT SERPL-MCNC: 8.9 G/DL (ref 6.4–8.2)
RBC # BLD AUTO: 4.35 M/UL (ref 4.1–5.7)
SODIUM SERPL-SCNC: 131 MMOL/L (ref 136–145)
WBC # BLD AUTO: 9.2 K/UL (ref 4.1–11.1)

## 2020-09-29 PROCEDURE — 74011000636 HC RX REV CODE- 636: Performed by: RADIOLOGY

## 2020-09-29 PROCEDURE — 96374 THER/PROPH/DIAG INJ IV PUSH: CPT

## 2020-09-29 PROCEDURE — 96375 TX/PRO/DX INJ NEW DRUG ADDON: CPT

## 2020-09-29 PROCEDURE — 80053 COMPREHEN METABOLIC PANEL: CPT

## 2020-09-29 PROCEDURE — 99282 EMERGENCY DEPT VISIT SF MDM: CPT

## 2020-09-29 PROCEDURE — 85025 COMPLETE CBC W/AUTO DIFF WBC: CPT

## 2020-09-29 PROCEDURE — 74011000258 HC RX REV CODE- 258: Performed by: RADIOLOGY

## 2020-09-29 PROCEDURE — 74011250636 HC RX REV CODE- 250/636: Performed by: EMERGENCY MEDICINE

## 2020-09-29 PROCEDURE — 74011250637 HC RX REV CODE- 250/637: Performed by: EMERGENCY MEDICINE

## 2020-09-29 PROCEDURE — 74011000250 HC RX REV CODE- 250: Performed by: EMERGENCY MEDICINE

## 2020-09-29 PROCEDURE — 36415 COLL VENOUS BLD VENIPUNCTURE: CPT

## 2020-09-29 PROCEDURE — 70487 CT MAXILLOFACIAL W/DYE: CPT

## 2020-09-29 RX ORDER — DEXAMETHASONE SODIUM PHOSPHATE 10 MG/ML
10 INJECTION INTRAMUSCULAR; INTRAVENOUS
Status: COMPLETED | OUTPATIENT
Start: 2020-09-29 | End: 2020-09-29

## 2020-09-29 RX ORDER — ONDANSETRON 2 MG/ML
4 INJECTION INTRAMUSCULAR; INTRAVENOUS
Status: COMPLETED | OUTPATIENT
Start: 2020-09-29 | End: 2020-09-29

## 2020-09-29 RX ORDER — KETOROLAC TROMETHAMINE 30 MG/ML
15 INJECTION, SOLUTION INTRAMUSCULAR; INTRAVENOUS
Status: COMPLETED | OUTPATIENT
Start: 2020-09-29 | End: 2020-09-29

## 2020-09-29 RX ORDER — CLINDAMYCIN HYDROCHLORIDE 300 MG/1
300 CAPSULE ORAL 4 TIMES DAILY
Qty: 28 CAP | Refills: 0 | Status: SHIPPED | OUTPATIENT
Start: 2020-09-29 | End: 2020-10-06

## 2020-09-29 RX ORDER — CLINDAMYCIN HYDROCHLORIDE 150 MG/1
600 CAPSULE ORAL
Status: COMPLETED | OUTPATIENT
Start: 2020-09-29 | End: 2020-09-29

## 2020-09-29 RX ORDER — SODIUM CHLORIDE 0.9 % (FLUSH) 0.9 %
10 SYRINGE (ML) INJECTION
Status: COMPLETED | OUTPATIENT
Start: 2020-09-29 | End: 2020-09-29

## 2020-09-29 RX ADMIN — DEXAMETHASONE SODIUM PHOSPHATE 10 MG: 10 INJECTION, SOLUTION INTRAMUSCULAR; INTRAVENOUS at 13:10

## 2020-09-29 RX ADMIN — KETOROLAC TROMETHAMINE 15 MG: 30 INJECTION, SOLUTION INTRAMUSCULAR at 13:10

## 2020-09-29 RX ADMIN — CLINDAMYCIN HYDROCHLORIDE 600 MG: 150 CAPSULE ORAL at 13:09

## 2020-09-29 RX ADMIN — ONDANSETRON 4 MG: 2 INJECTION INTRAMUSCULAR; INTRAVENOUS at 13:11

## 2020-09-29 RX ADMIN — Medication 10 ML: at 14:13

## 2020-09-29 RX ADMIN — SODIUM CHLORIDE 100 ML: 900 INJECTION, SOLUTION INTRAVENOUS at 14:13

## 2020-09-29 RX ADMIN — LIDOCAINE HYDROCHLORIDE: 20 SOLUTION ORAL; TOPICAL at 13:09

## 2020-09-29 RX ADMIN — IOPAMIDOL 100 ML: 612 INJECTION, SOLUTION INTRAVENOUS at 14:13

## 2020-09-29 NOTE — DISCHARGE INSTRUCTIONS
Patient Education        Tooth and Gum Pain: Care Instructions  Your Care Instructions    The most common causes of dental pain are tooth decay and gum disease. Pain can also be caused by an infection of the tooth (abscess) or the gums. Or you may have pain from a broken or cracked tooth. Other causes of pain include infection and damage to a tooth from nervous grinding of your teeth. A wisdom tooth can be painful when it is coming in but cannot break through the gum. It can also be painful when the tooth is only partway in and extra gum tissue has formed around it. The tissue can get inflamed (pericoronitis), and sometimes it gets infected. Prompt dental care can help find the cause of your toothache and keep the tooth from dying or gum disease from getting worse. Self-care at home may reduce your pain and discomfort. Follow-up care is a key part of your treatment and safety. Be sure to make and go to all appointments, and call your dentist or doctor if you are having problems. It's also a good idea to know your test results and keep a list of the medicines you take. How can you care for yourself at home? · To reduce pain and facial swelling, put an ice or cold pack on the outside of your cheek for 10 to 20 minutes at a time. Put a thin cloth between the ice and your skin. Do not use heat. · If your doctor prescribed antibiotics, take them as directed. Do not stop taking them just because you feel better. You need to take the full course of antibiotics. · Ask your doctor if you can take an over-the-counter pain medicine, such as acetaminophen (Tylenol), ibuprofen (Advil, Motrin), or naproxen (Aleve). Be safe with medicines. Read and follow all instructions on the label. · Avoid very hot, cold, or sweet foods and drinks if they increase your pain. · Rinse your mouth with warm salt water every 2 hours to help relieve pain and swelling. Mix 1 teaspoon of salt in 8 ounces of water.   · Talk to your dentist about using special toothpaste for sensitive teeth. To reduce pain on contact with heat or cold or when brushing, brush with this toothpaste regularly or rub a small amount of the paste on the sensitive area with a clean finger 2 or 3 times a day. Floss gently between your teeth. · Do not smoke or use spit tobacco. Tobacco use can make gum problems worse, decreases your ability to fight infection in your gums, and delays healing. If you need help quitting, talk to your doctor about stop-smoking programs and medicines. These can increase your chances of quitting for good. When should you call for help? Call 911 anytime you think you may need emergency care. For example, call if:    · You have trouble breathing. Call your dentist or doctor now or seek immediate medical care if:    · You have signs of infection, such as:  ? Increased pain, swelling, warmth, or redness. ? Red streaks leading from the area. ? Pus draining from the area. ? A fever. Watch closely for changes in your health, and be sure to contact your doctor if:    · You do not get better as expected. Where can you learn more? Go to http://www.gray.com/  Enter H417 in the search box to learn more about \"Tooth and Gum Pain: Care Instructions. \"  Current as of: March 25, 2020               Content Version: 12.6  © 9489-7375 [employer]. Care instructions adapted under license by [employer] (which disclaims liability or warranty for this information). If you have questions about a medical condition or this instruction, always ask your healthcare professional. [place] [address] any warranty or liability for your use of this information.

## 2020-09-29 NOTE — ED TRIAGE NOTES
Triage:  Pt to the ED due to concerns over worsening neck pain. Pt states recently had a dental cleaning, soon after he developed an abscess to the right lower molar. Pt states since has been treated for infection. However, the patient states the neck pain has worsened and is effecting his ability to sleep and turn his head. Pt into triage with stable gait.

## 2020-09-29 NOTE — ED PROVIDER NOTES
HPI patient is a 59-year-old male with past medical history significant for arthritis,, the back, chronic pain, ulcerative pancolitis without complication and hypertension who presents the ED with right-sided facial pain and neck pain. He was evaluated by his dentist on 9/23/2020 and had an extensive cleaning. Several days later he started with right lower dental pain and swelling. He was started on Keflex and after 6 doses developed a neck rash. He states that his right lower molars, right lower gums and right side of his neck are painful making it difficult to sleep. Denies any fever, difficulty breathing, difficulty swallowing, SOB or chest pain. Denies any nausea, vomiting or diarrhea. Pt. Reports that he has not had any pain medications today prior to arrival.  He has an appointment with an endodontist tomorrow.        Past Medical History:   Diagnosis Date    Arthritis     Asthma     Basal cell carcinoma of back     Cancer (HCC)     SKIN CANCERS-BASAL    Chronic pain     back    Hypertension     Other ill-defined conditions(799.89)     ulcerative cholitis    Other ill-defined conditions(799.89)     ruptured l3-4    Other ill-defined conditions(799.89)     spinal stensis    Ulcerative pancolitis without complication ([facility] [address] 6/9/5524       Past Surgical History:   Procedure Laterality Date    ABDOMEN SURGERY PROC UNLISTED  1983    hernia repair    COLONOSCOPY N/A 6/13/2018    [facility] ENDOSCOPY    HX COLONOSCOPY      HX HEENT      TONSILS AS A CHILD    HX HEENT      CATARACTS    HX ORTHOPAEDIC  4/70    fussion- l45    HX ORTHOPAEDIC  2015    [facility]         Family History:   Problem Relation Age of Onset    Hypertension Mother     Heart Disease Mother         CHF    Stroke Mother     Inflammatory Bowel Dz Mother     Osteoporosis Mother     Hypertension Father     Parkinsonism Father     Asthma Brother     Hypertension Brother  Psychiatric Disorder Brother         ANXIETY    Asthma Brother     Cancer Paternal Grandfather         colon    Cancer Other         stomach cancer - mother's grandfather    Anesth Problems Neg Hx        Social History     Socioeconomic History    Marital status:      Spouse name: Not on file    Number of children: Not on file    Years of education: Not on file    Highest education level: Not on file   Occupational History    Not on file   Social Needs    Financial resource strain: Not on file    Food insecurity     Worry: Not on file     Inability: Not on file   [employer] needs     Medical: Not on file     Non-medical: Not on file   Tobacco Use    Smoking status: Never Smoker    Smokeless tobacco: Never Used   Substance and Sexual Activity    Alcohol use: No    Drug use: No    Sexual activity: Not on file   Lifestyle    Physical activity     Days per week: Not on file     Minutes per session: Not on file    Stress: Not on file   Relationships    Social connections     Talks on phone: Not on file     Gets together: Not on file     Attends Hoahaoism service: Not on file     Active member of club or organization: Not on file     Attends meetings of clubs or organizations: Not on file     Relationship status: Not on file    Intimate partner violence     Fear of current or ex partner: Not on file     Emotionally abused: Not on file     Physically abused: Not on file     Forced sexual activity: Not on file   Other Topics Concern    Not on file   Social History Narrative    Not on file         ALLERGIES: Adhesive; Cephalexin; Pcn [penicillins]; and Zithromax [azithromycin]    Review of Systems   Constitutional: Negative for activity change, appetite change, fever and unexpected weight change. HENT: Positive for dental problem and facial swelling. Negative for rhinorrhea, sore throat and trouble swallowing. Eyes: Negative for visual disturbance.    Respiratory: Negative for cough, chest tightness and shortness of breath. Cardiovascular: Negative for chest pain, palpitations and leg swelling. Gastrointestinal: Negative for abdominal pain, diarrhea, nausea and vomiting. Genitourinary: Negative for dysuria. Musculoskeletal: Positive for neck pain. Negative for back pain. Skin: Positive for rash. Fine non tender, non itchy macular rash on his neck   Neurological: Negative for dizziness, light-headedness and headaches. All other systems reviewed and are negative. Vitals:    09/29/20 1220   BP: (!) 156/71   Pulse: 92   Resp: 20   Temp: 97.2 °F (36.2 °C)   SpO2: 97%   Weight: 113.4 kg (250 lb)   Height: 5' 11\" (1.803 m)            Physical Exam  Vitals signs and nursing note reviewed. Constitutional:       General: He is not in acute distress. Appearance: He is obese. He is not ill-appearing, toxic-appearing or diaphoretic. Comments: obese elderly male; non smoker;    HENT:      Head: Normocephalic. Mouth/Throat:      Mouth: Mucous membranes are moist.      Comments: Lower right molars are capped; gum tissue is red and swollen; no obvious dental abscess  Neck:      Musculoskeletal: Normal range of motion and neck supple. Muscular tenderness present. Comments: Reports right-sided neck tenderness with active range of motion of the head neck  Cardiovascular:      Rate and Rhythm: Normal rate and regular rhythm. Pulmonary:      Effort: Pulmonary effort is normal.      Breath sounds: Normal breath sounds. Musculoskeletal: Normal range of motion. Lymphadenopathy:      Cervical: No cervical adenopathy. Skin:     General: Skin is warm and dry. Findings: No rash. Neurological:      General: No focal deficit present. Mental Status: He is alert and oriented to person, place, and time.    Psychiatric:         Mood and Affect: Mood normal.         Behavior: Behavior normal.          MDM       Procedures    Ct Maxillofacial W Cont    Result Date: 9/29/2020  IMPRESSION: 1. Periapical lucency at the root of tooth #30, with mild soft tissue swelling of the buccal gingiva overlying the right mandible. No evidence of odontogenic abscess. Patient has a follow-up appointment with the endodontist tomorrow. We will continue with clindamycin as previously prescribed. 3:29 PM  Patient's results and plan of care have been reviewed with him and his wife. Patient and/or family have verbally conveyed their understanding and agreement of the patient's signs, symptoms, diagnosis, treatment and prognosis and additionally agree to follow up as recommended or return to the Emergency Room should his condition change prior to follow-up. Discharge instructions have also been provided to the patient with some educational information regarding his diagnosis as well a list of reasons why he would want to return to the ER prior to his follow-up appointment should his condition change. Janes Yen NP    I was personally available for consultation in the emergency department. I have reviewed the chart and agree with the documentation recorded by the [facility], including the assessment, treatment plan, and disposition.   Abner Bettencourt MD

## 2020-10-28 PROBLEM — I77.810 ECTATIC THORACIC AORTA (HCC): Status: ACTIVE | Noted: 2020-10-28

## 2020-11-15 PROBLEM — E11.69 TYPE 2 DIABETES MELLITUS WITH HYPERCHOLESTEROLEMIA (HCC): Status: ACTIVE | Noted: 2020-11-15

## 2020-11-15 PROBLEM — E78.00 TYPE 2 DIABETES MELLITUS WITH HYPERCHOLESTEROLEMIA (HCC): Status: ACTIVE | Noted: 2020-11-15

## 2020-12-21 ENCOUNTER — TRANSCRIBE ORDER (OUTPATIENT)
Dept: SCHEDULING | Age: 70
End: 2020-12-21

## 2020-12-21 DIAGNOSIS — K51.00 ULCERATIVE PANCOLITIS WITHOUT COMPLICATION (HCC): Primary | ICD-10-CM

## 2020-12-22 ENCOUNTER — TRANSCRIBE ORDER (OUTPATIENT)
Dept: SCHEDULING | Age: 70
End: 2020-12-22

## 2020-12-22 DIAGNOSIS — Z79.52 CURRENT CHRONIC USE OF SYSTEMIC STEROIDS: Primary | ICD-10-CM

## 2020-12-30 ENCOUNTER — HOSPITAL ENCOUNTER (OUTPATIENT)
Dept: MAMMOGRAPHY | Age: 70
Discharge: HOME | End: 2020-12-30
Attending: INTERNAL MEDICINE
Payer: MEDICARE

## 2020-12-30 DIAGNOSIS — Z79.52 CURRENT CHRONIC USE OF SYSTEMIC STEROIDS: ICD-10-CM

## 2020-12-30 PROCEDURE — 77080 DXA BONE DENSITY AXIAL: CPT

## 2021-01-30 ENCOUNTER — TRANSCRIBE ORDER (OUTPATIENT)
Dept: REGISTRATION | Age: 71
End: 2021-01-30

## 2021-01-30 ENCOUNTER — HOSPITAL ENCOUNTER (OUTPATIENT)
Dept: PREADMISSION TESTING | Age: 71
Discharge: HOME | End: 2021-01-30
Payer: MEDICARE

## 2021-01-30 DIAGNOSIS — Z01.812 PRE-PROCEDURE LAB EXAM: ICD-10-CM

## 2021-01-30 DIAGNOSIS — Z01.812 PRE-PROCEDURE LAB EXAM: Primary | ICD-10-CM

## 2021-01-30 PROCEDURE — U0003 INFECTIOUS AGENT DETECTION BY NUCLEIC ACID (DNA OR RNA); SEVERE ACUTE RESPIRATORY SYNDROME CORONAVIRUS 2 (SARS-COV-2) (CORONAVIRUS DISEASE [COVID-19]), AMPLIFIED PROBE TECHNIQUE, MAKING USE OF HIGH THROUGHPUT TECHNOLOGIES AS DESCRIBED BY CMS-2020-01-R: HCPCS

## 2021-02-02 LAB — SARS-COV-2, COV2NT: NOT DETECTED

## 2021-02-05 ENCOUNTER — HOSPITAL ENCOUNTER (OUTPATIENT)
Age: 71
Setting detail: OUTPATIENT SURGERY
Discharge: HOME | End: 2021-02-05
Attending: INTERNAL MEDICINE | Admitting: INTERNAL MEDICINE
Payer: MEDICARE

## 2021-02-05 ENCOUNTER — ANESTHESIA EVENT (OUTPATIENT)
Dept: ENDOSCOPY | Age: 71
End: 2021-02-05
Payer: MEDICARE

## 2021-02-05 ENCOUNTER — ANESTHESIA (OUTPATIENT)
Dept: ENDOSCOPY | Age: 71
End: 2021-02-05
Payer: MEDICARE

## 2021-02-05 VITALS
WEIGHT: 230.3 LBS | RESPIRATION RATE: 17 BRPM | OXYGEN SATURATION: 98 % | TEMPERATURE: 98.9 F | SYSTOLIC BLOOD PRESSURE: 116 MMHG | BODY MASS INDEX: 34.11 KG/M2 | DIASTOLIC BLOOD PRESSURE: 73 MMHG | HEIGHT: 69 IN | HEART RATE: 64 BPM

## 2021-02-05 PROCEDURE — 88305 TISSUE EXAM BY PATHOLOGIST: CPT

## 2021-02-05 PROCEDURE — 76060000031 HC ANESTHESIA FIRST 0.5 HR: Performed by: INTERNAL MEDICINE

## 2021-02-05 PROCEDURE — 76040000019: Performed by: INTERNAL MEDICINE

## 2021-02-05 PROCEDURE — 74011250636 HC RX REV CODE- 250/636: Performed by: NURSE ANESTHETIST, CERTIFIED REGISTERED

## 2021-02-05 PROCEDURE — 77030021593 HC FCPS BIOP ENDOSC BSC -A: Performed by: INTERNAL MEDICINE

## 2021-02-05 PROCEDURE — 74011000250 HC RX REV CODE- 250: Performed by: NURSE ANESTHETIST, CERTIFIED REGISTERED

## 2021-02-05 PROCEDURE — 2709999900 HC NON-CHARGEABLE SUPPLY: Performed by: INTERNAL MEDICINE

## 2021-02-05 RX ORDER — SODIUM CHLORIDE 0.9 % (FLUSH) 0.9 %
5-40 SYRINGE (ML) INJECTION AS NEEDED
Status: DISCONTINUED | OUTPATIENT
Start: 2021-02-05 | End: 2021-02-05 | Stop reason: HOSPADM

## 2021-02-05 RX ORDER — SODIUM CHLORIDE 9 MG/ML
75 INJECTION, SOLUTION INTRAVENOUS CONTINUOUS
Status: DISCONTINUED | OUTPATIENT
Start: 2021-02-05 | End: 2021-02-05 | Stop reason: HOSPADM

## 2021-02-05 RX ORDER — MULTIVIT WITH MINERALS/HERBS
1 TABLET ORAL DAILY
COMMUNITY

## 2021-02-05 RX ORDER — FENTANYL CITRATE 50 UG/ML
12.5-2 INJECTION, SOLUTION INTRAMUSCULAR; INTRAVENOUS
Status: DISCONTINUED | OUTPATIENT
Start: 2021-02-05 | End: 2021-02-05 | Stop reason: HOSPADM

## 2021-02-05 RX ORDER — NALOXONE HYDROCHLORIDE 0.4 MG/ML
0.4 INJECTION, SOLUTION INTRAMUSCULAR; INTRAVENOUS; SUBCUTANEOUS
Status: DISCONTINUED | OUTPATIENT
Start: 2021-02-05 | End: 2021-02-05 | Stop reason: HOSPADM

## 2021-02-05 RX ORDER — PHENYLEPHRINE HCL IN 0.9% NACL 0.4MG/10ML
SYRINGE (ML) INTRAVENOUS AS NEEDED
Status: DISCONTINUED | OUTPATIENT
Start: 2021-02-05 | End: 2021-02-05 | Stop reason: HOSPADM

## 2021-02-05 RX ORDER — ATROPINE SULFATE 0.1 MG/ML
0.5 INJECTION INTRAVENOUS
Status: DISCONTINUED | OUTPATIENT
Start: 2021-02-05 | End: 2021-02-05 | Stop reason: HOSPADM

## 2021-02-05 RX ORDER — SODIUM CHLORIDE 0.9 % (FLUSH) 0.9 %
5-40 SYRINGE (ML) INJECTION EVERY 8 HOURS
Status: DISCONTINUED | OUTPATIENT
Start: 2021-02-05 | End: 2021-02-05 | Stop reason: HOSPADM

## 2021-02-05 RX ORDER — EPINEPHRINE 0.1 MG/ML
1 INJECTION INTRACARDIAC; INTRAVENOUS
Status: DISCONTINUED | OUTPATIENT
Start: 2021-02-05 | End: 2021-02-05 | Stop reason: HOSPADM

## 2021-02-05 RX ORDER — FLUMAZENIL 0.1 MG/ML
0.2 INJECTION INTRAVENOUS
Status: DISCONTINUED | OUTPATIENT
Start: 2021-02-05 | End: 2021-02-05 | Stop reason: HOSPADM

## 2021-02-05 RX ORDER — LIDOCAINE HYDROCHLORIDE 20 MG/ML
INJECTION, SOLUTION EPIDURAL; INFILTRATION; INTRACAUDAL; PERINEURAL AS NEEDED
Status: DISCONTINUED | OUTPATIENT
Start: 2021-02-05 | End: 2021-02-05 | Stop reason: HOSPADM

## 2021-02-05 RX ORDER — SODIUM CHLORIDE 9 MG/ML
INJECTION, SOLUTION INTRAVENOUS
Status: DISCONTINUED | OUTPATIENT
Start: 2021-02-05 | End: 2021-02-05 | Stop reason: HOSPADM

## 2021-02-05 RX ORDER — PROPOFOL 10 MG/ML
INJECTION, EMULSION INTRAVENOUS AS NEEDED
Status: DISCONTINUED | OUTPATIENT
Start: 2021-02-05 | End: 2021-02-05 | Stop reason: HOSPADM

## 2021-02-05 RX ORDER — MELATONIN
1000 DAILY
COMMUNITY

## 2021-02-05 RX ORDER — BISMUTH SUBSALICYLATE 262 MG
1 TABLET,CHEWABLE ORAL DAILY
COMMUNITY

## 2021-02-05 RX ORDER — DEXTROMETHORPHAN/PSEUDOEPHED 2.5-7.5/.8
1.2 DROPS ORAL
Status: DISCONTINUED | OUTPATIENT
Start: 2021-02-05 | End: 2021-02-05 | Stop reason: HOSPADM

## 2021-02-05 RX ORDER — MIDAZOLAM HYDROCHLORIDE 1 MG/ML
.25-5 INJECTION, SOLUTION INTRAMUSCULAR; INTRAVENOUS
Status: DISCONTINUED | OUTPATIENT
Start: 2021-02-05 | End: 2021-02-05 | Stop reason: HOSPADM

## 2021-02-05 RX ADMIN — PROPOFOL 50 MG: 10 INJECTION, EMULSION INTRAVENOUS at 14:50

## 2021-02-05 RX ADMIN — PHENYLEPHRINE HYDROCHLORIDE 80 MCG: 10 INJECTION INTRAVENOUS at 15:11

## 2021-02-05 RX ADMIN — PROPOFOL 25 MG: 10 INJECTION, EMULSION INTRAVENOUS at 15:03

## 2021-02-05 RX ADMIN — PHENYLEPHRINE HYDROCHLORIDE 80 MCG: 10 INJECTION INTRAVENOUS at 15:01

## 2021-02-05 RX ADMIN — PROPOFOL 25 MG: 10 INJECTION, EMULSION INTRAVENOUS at 15:00

## 2021-02-05 RX ADMIN — PROPOFOL 25 MG: 10 INJECTION, EMULSION INTRAVENOUS at 15:06

## 2021-02-05 RX ADMIN — PROPOFOL 50 MG: 10 INJECTION, EMULSION INTRAVENOUS at 14:52

## 2021-02-05 RX ADMIN — PROPOFOL 25 MG: 10 INJECTION, EMULSION INTRAVENOUS at 14:54

## 2021-02-05 RX ADMIN — PROPOFOL 25 MG: 10 INJECTION, EMULSION INTRAVENOUS at 14:57

## 2021-02-05 RX ADMIN — SODIUM CHLORIDE: 900 INJECTION, SOLUTION INTRAVENOUS at 14:16

## 2021-02-05 RX ADMIN — LIDOCAINE HYDROCHLORIDE 50 MG: 20 INJECTION, SOLUTION EPIDURAL; INFILTRATION; INTRACAUDAL; PERINEURAL at 14:50

## 2021-02-05 RX ADMIN — PROPOFOL 25 MG: 10 INJECTION, EMULSION INTRAVENOUS at 15:10

## 2021-02-05 RX ADMIN — PHENYLEPHRINE HYDROCHLORIDE 80 MCG: 10 INJECTION INTRAVENOUS at 15:07

## 2021-02-05 NOTE — DISCHARGE INSTRUCTIONS
[address]  174 [facility], [address] [place] [address]          Geovanny Cannon  [id number]  1950    COLON DISCHARGE INSTRUCTIONS    DISCOMFORT:  Redness at IV site- apply warm compress to area; if redness or soreness persist- contact your physician  There may be a slight amount of blood passed from the rectum  Gaseous discomfort- walking, belching will help relieve any discomfort    DIET:   Regular diet. ACTIVITY:  You may resume your normal daily activities it is recommended that you spend the remainder of the day resting -  avoid any strenuous activity. You may not operate a vehicle for 12 hours  You may not engage in an occupation involving machinery or appliances for rest of today  You may not drink alcoholic beverages for at least 12 hours  Avoid making any critical decisions for at least 24 hour    CALL M.D. ANY SIGN OF:   Increasing pain, nausea, vomiting  Abdominal distension (swelling)  New increased bleeding (oral or rectal)  Fever (chills)  Pain in chest area  Bloody discharge from nose or mouth  Shortness of breath     Follow-up Instructions:   Call Dr. Alicia Chavarria for any questions or problems. Telephone # [phone]  Biopsy results will be available in  5 to 7 days    Impression:  -Normal appearing mucosa throughout the colon. Biopsies obtained and sent for pathology  -Small internal hemorrhoids. Recommendations:   -Await pathology. -Repeat colonoscopy in 5 years for surveillance. -Regular diet.  -no Non-Steroidal Anti Inflammatorys (NSAIDS)   -Follow up in the office as scheduled. Resume normal medication(s). Alicia Chavarria MD      Learning About Coronavirus [phone])  Coronavirus [phone]): Overview  What is coronavirus (COVID-19)? The coronavirus disease (COVID-19) is caused by a virus. It is an illness that was first found in [place], [place], in December 2019. It has since spread worldwide. The virus can cause fever, cough, and trouble breathing. In severe cases, it can cause pneumonia and make it hard to breathe without help. It can cause death. Coronaviruses are a large group of viruses. They cause the common cold. They also cause more serious illnesses like Middle East respiratory syndrome (MERS) and severe acute respiratory syndrome (SARS). COVID-19 is caused by a novel coronavirus. That means it's a new type that has not been seen in people before. This virus spreads person-to-person through droplets from coughing and sneezing. It can also spread when you are close to someone who is infected. And it can spread when you touch something that has the virus on it, such as a doorknob or a tabletop. What can you do to protect yourself from coronavirus (COVID-19)? The best way to protect yourself from getting sick is to:  · Avoid areas where there is an outbreak. · Avoid contact with people who may be infected. · Wash your hands often with soap or alcohol-based hand sanitizers. · Avoid crowds and try to stay at least 6 feet away from other people. · Wash your hands often, especially after you cough or sneeze. Use soap and water, and scrub for at least 20 seconds. If soap and water aren't available, use an alcohol-based hand . · Avoid touching your mouth, nose, and eyes. What can you do to avoid spreading the virus to others? To help avoid spreading the virus to others:  · Cover your mouth with a tissue when you cough or sneeze. Then throw the tissue in the trash. · Use a disinfectant to clean things that you touch often. · Stay home if you are sick or have been exposed to the virus. Don't go to school, work, or public areas. And don't use public transportation. · If you are sick:  ? Leave your home only if you need to get medical care. But call the doctor's office first so they know you're coming. And wear a face mask, if you have one.  ? If you have a face mask, wear it whenever you're around other people.  It can help stop the spread of the virus when you cough or sneeze. ? Clean and disinfect your home every day. Use household  and disinfectant wipes or sprays. Take special care to clean things that you grab with your hands. These include doorknobs, remote controls, phones, and handles on your refrigerator and microwave. And don't forget countertops, tabletops, bathrooms, and computer keyboards. When to call for help  Call 911 anytime you think you may need emergency care. For example, call if:  · You have severe trouble breathing. (You can't talk at all.)  · You have constant chest pain or pressure. · You are severely dizzy or lightheaded. · You are confused or can't think clearly. · Your face and lips have a blue color. · You pass out (lose consciousness) or are very hard to wake up. Call your doctor now if you develop symptoms such as:  · Shortness of breath. · Fever. · Cough. If you need to get care, call ahead to the doctor's office for instructions before you go. Make sure you wear a face mask, if you have one, to prevent exposing other people to the virus. Where can you get the latest information? The following health organizations are tracking and studying this virus. Their websites contain the most up-to-date information. Maco Segovia also learn what to do if you think you may have been exposed to the virus. · U.S. Centers for Disease Control and Prevention (CDC): The CDC provides updated news about the disease and travel advice. The website also tells you how to prevent the spread of infection. www.cdc.gov  · World Health Organization [facility]): WHO offers information about the virus outbreaks. WHO also has travel advice. www.who.int  Current as of: April 1, 2020               Content Version: 12.4  © 4473-1728 [employer].    Care instructions adapted under license by your healthcare professional. If you have questions about a medical condition or this instruction, always ask your healthcare professional. [employer] disclaims any warranty or liability for your use of this information.

## 2021-02-05 NOTE — H&P
[address]  611 [facility], [address] [place] [address] Pkwy  [phone]        History and Physical     NAME: Anushka La   :  1950   MRN:  [id number]         HPI:  Anushka Grandchild is a 65 y.o. male here for colonoscopy. Has h/o ulcerative pan-colitis. Here for surveillance.  Mild colitis on last colonoscopy    Past Surgical History:   Procedure Laterality Date    COLONOSCOPY N/A 2018    COLONOS[facility] performed by Vu Navarrete MD at [facility] ENDOSCOPY    HX COLONOSCOPY      HX HEENT      TONSILS AS A CHILD    HX HEENT      CATARACTS    HX ORTHOPAEDIC      fussion- l45    HX ORTHOPAEDIC  2015    rods placed [place] [facility]    [place] ABDOMEN SURGERY PROC UNLISTED      hernia repair     Past Medical History:   Diagnosis Date    Arthritis     Asthma     Basal cell carcinoma of back     Cancer ([address]     SKIN CANCERS-BASAL    Chronic pain     back    Hypertension     Other ill-defined conditions(799.89)     ulcerative cholitis    Other ill-defined conditions(799.89)     ruptured l3-4    Other ill-defined conditions(799.89)     spinal stensis    Ulcerative pancolitis without complication ([address] 7334     Social History     Tobacco Use    Smoking status: Never Smoker    Smokeless tobacco: Never Used   Substance Use Topics    Alcohol use: No    Drug use: No     Allergies   Allergen Reactions    Latex Rash    Adhesive Rash    Cephalexin Unknown (comments)     Unsure of reaction    Pcn [Penicillins] Rash    Zithromax [Azithromycin] Rash     Family History   Problem Relation Age of Onset    Hypertension Mother     Heart Disease Mother         CHF    Stroke Mother     Inflammatory Bowel Dz Mother     Osteoporosis Mother     Hypertension Father     Parkinsonism Father     Asthma Brother     Hypertension Brother     Psychiatric Disorder Brother         ANXIETY    Asthma Brother     Cancer Paternal Grandfather         colon    Cancer Other         stomach cancer - mother's grandfather    Anesth Problems Neg Hx      Current Facility-Administered Medications   Medication Dose Route Frequency    0.9% sodium chloride infusion  75 mL/hr IntraVENous CONTINUOUS    sodium chloride (NS) flush 5-40 mL  5-40 mL IntraVENous Q8H    sodium chloride (NS) flush 5-40 mL  5-40 mL IntraVENous PRN    midazolam (VERSED) injection 0.25-5 mg  0.25-5 mg IntraVENous Multiple    fentaNYL citrate (PF) injection 12.5-200 mcg  12.5-200 mcg IntraVENous Multiple    naloxone (NARCAN) injection 0.4 mg  0.4 mg IntraVENous Multiple    flumazeniL (ROMAZICON) 0.1 mg/mL injection 0.2 mg  0.2 mg IntraVENous Multiple    simethicone (MYLICON) 85OU/1.7VY oral drops 80 mg  1.2 mL Oral Multiple    atropine injection 0.5 mg  0.5 mg IntraVENous ONCE PRN    EPINEPHrine (ADRENALIN) 0.1 mg/mL syringe 1 mg  1 mg Endoscopically ONCE PRN     Facility-Administered Medications Ordered in Other Encounters   Medication Dose Route Frequency    0.9% sodium chloride infusion   IntraVENous CONTINUOUS       PHYSICAL EXAM:    BP (!) 149/85   Pulse 85   Temp 98 °F (36.7 °C)   Resp 16   Ht 5' 9\" (1.753 m)   Wt 104.5 kg (230 lb 4.8 oz)   SpO2 97%   BMI 34.01 kg/m²      General: WD, WN. Alert, cooperative, no acute distress    HEENT: NC, Atraumatic. PERRLA, EOMI. Anicteric sclerae. Lungs:  CTA Bilaterally. No Wheezing/Rhonchi/Rales. Heart:  Regular  rhythm,  No murmur, No Rubs, No Gallops  Abdomen: Soft, Non distended, Non tender. +Bowel sounds, no HSM  Extremities: No c/c/e  Neurologic:  CN 2-12 gi, Alert and oriented X 3. No acute neurological distress   Psych:   Good insight. Not anxious nor agitated.        Assessment:   · Ulcerative pan-colitis - asymptomatic surveillance    Plan:   · Endoscopic procedure: Colonoscopy  · Anesthesia plan: Monitored Anesthesia Care

## 2021-02-05 NOTE — PROCEDURES
[address] [place] [address]  [phone]               Colonoscopy Operative Report      Indications:  Surveillance for ulcerative pan-colitis    :  Wood Martini MD    Staff: Endoscopy Technician-1: Hieu King  Endoscopy RN-1: Faisal Saravia RN     Referring Provider: Damir Neff MD    Sedation:  MAC anesthesia    Procedure Details:  After informed consent was obtained with all risks and benefits of procedure explained and preoperative exam completed, the patient was taken to the endoscopy suite and placed in the left lateral decubitus position. Upon sequential sedation as per above, a digital rectal exam was performed  And was normal.  The Olympus videocolonoscope  was inserted in the rectum and carefully advanced to the cecum, which was identified by the ileocecal valve and appendiceal orifice. The quality of preparation was good. The colonoscope was slowly withdrawn with careful evaluation between folds. Retroflexion in the rectum was performed and was normal..     Findings:   Rectum: Grade 2 internal hemorrhoid(s);   Sigmoid: normal  Descending Colon: normal  Transverse Colon: normal  Ascending Colon: normal  Cecum: normal  Terminal Ileum: not intubated    Interventions:  biopsy of colon ascending colon, transverse colon, descending colon, sigmoid colon and rectum    Specimen Removed:   ID Type Source Tests Collected by Time Destination   1 : Ascending Colon Biopsies Preservative Colon, Ascending  Kaylene Pete MD 2/5/2021 1501 Pathology   2 : Transverse Colon Biopsies Preservative Colon, Olivia Homans  Kaylene Pete MD 2/5/2021 1503 Pathology   3 : Descending Colon Biopsies Preservative Colon, Descending  Kaylene Pete MD 2/5/2021 1506 Pathology   4 : Sigmoid Colon Biopsies Preservative Sigmoid  Kaylene Pete MD 2/5/2021 1510 Pathology   5 : Rectum Biopsies Preservative Rectum  Kaylene Pete MD 2/5/2021 1511 Pathology       Complications: None.     EBL:  Minimal.    Impression:  -Normal appearing mucosa throughout the colon. Biopsies obtained and sent for pathology  -Small internal hemorrhoids.     Recommendations:   -Await pathology.  -Repeat colonoscopy in 5 years for surveillance.  -Regular diet.  -no Non-Steroidal Anti Inflammatorys (NSAIDS)   -Follow up in the office as scheduled.   Resume normal medication(s).     Discharge Disposition:  Home in the company of a  when able to ambulate.    Jaspreet Mercado MD  2/5/2021  3:28 PM

## 2021-02-05 NOTE — PROGRESS NOTES

## 2021-02-17 PROBLEM — C61 PROSTATE CANCER (HCC): Status: ACTIVE | Noted: 2021-02-17

## 2021-02-26 NOTE — ANESTHESIA POSTPROCEDURE EVALUATION
Procedure(s):  COLONOSCOPY   :-  COLON BIOPSY. MAC    Anesthesia Post Evaluation        Patient location during evaluation: PACU  Note status: adequate.   Level of consciousness: awake  Pain management: satisfactory to patient  Airway patency: patent  Anesthetic complications: no  Cardiovascular status: acceptable  Respiratory status: acceptable  Hydration status: acceptable  Post anesthesia nausea and vomiting:  controlled      INITIAL Post-op Vital signs:   Vitals Value Taken Time   /70 02/17/21 1004   Temp 37.2 °C (98.9 °F) 02/05/21 1520   Pulse 64 02/05/21 1535   Resp 17 02/05/21 1535   SpO2 98 % 02/05/21 1535

## 2021-04-29 ENCOUNTER — TRANSCRIBE ORDER (OUTPATIENT)
Dept: SCHEDULING | Age: 71
End: 2021-04-29

## 2021-04-29 DIAGNOSIS — C61 MALIGNANT NEOPLASM OF PROSTATE (HCC): Primary | ICD-10-CM

## 2021-05-10 ENCOUNTER — HOSPITAL ENCOUNTER (OUTPATIENT)
Dept: CT IMAGING | Age: 71
Discharge: HOME | End: 2021-05-10
Attending: UROLOGY
Payer: MEDICARE

## 2021-05-10 ENCOUNTER — HOSPITAL ENCOUNTER (OUTPATIENT)
Dept: NUCLEAR MEDICINE | Age: 71
Discharge: HOME | End: 2021-05-10
Attending: UROLOGY
Payer: MEDICARE

## 2021-05-10 DIAGNOSIS — C61 MALIGNANT NEOPLASM OF PROSTATE (HCC): ICD-10-CM

## 2021-05-10 PROCEDURE — 74011000636 HC RX REV CODE- 636: Performed by: RADIOLOGY

## 2021-05-10 PROCEDURE — 74177 CT ABD & PELVIS W/CONTRAST: CPT

## 2021-05-10 PROCEDURE — 78306 BONE IMAGING WHOLE BODY: CPT

## 2021-05-10 RX ADMIN — IOPAMIDOL 100 ML: 755 INJECTION, SOLUTION INTRAVENOUS at 11:17

## 2021-06-23 DIAGNOSIS — C61 PROSTATE CANCER (HCC): Primary | ICD-10-CM

## 2021-07-19 ENCOUNTER — HOSPITAL ENCOUNTER (OUTPATIENT)
Dept: RADIATION THERAPY | Age: 71
Discharge: HOME | End: 2021-07-19

## 2021-07-20 ENCOUNTER — HOSPITAL ENCOUNTER (OUTPATIENT)
Dept: PET IMAGING | Age: 71
Discharge: HOME | End: 2021-07-20
Attending: RADIOLOGY
Payer: MEDICARE

## 2021-07-20 VITALS — HEIGHT: 69 IN | BODY MASS INDEX: 31.84 KG/M2 | WEIGHT: 215 LBS

## 2021-07-20 DIAGNOSIS — C61 PROSTATE CANCER (HCC): ICD-10-CM

## 2021-07-20 PROCEDURE — 78815 PET IMAGE W/CT SKULL-THIGH: CPT

## 2021-07-20 RX ORDER — SODIUM CHLORIDE 0.9 % (FLUSH) 0.9 %
10 SYRINGE (ML) INJECTION
Status: COMPLETED | OUTPATIENT
Start: 2021-07-20 | End: 2021-07-20

## 2021-07-20 RX ADMIN — Medication 10 ML: at 14:35

## 2021-07-26 ENCOUNTER — HOSPITAL ENCOUNTER (OUTPATIENT)
Dept: RADIATION THERAPY | Age: 71
Discharge: HOME | End: 2021-07-26

## 2021-08-17 ENCOUNTER — HOSPITAL ENCOUNTER (OUTPATIENT)
Dept: RADIATION THERAPY | Age: 71
Discharge: HOME | End: 2021-08-17

## 2021-08-18 ENCOUNTER — HOSPITAL ENCOUNTER (OUTPATIENT)
Dept: RADIATION THERAPY | Age: 71
Discharge: HOME | End: 2021-08-18

## 2021-08-19 ENCOUNTER — HOSPITAL ENCOUNTER (OUTPATIENT)
Dept: RADIATION THERAPY | Age: 71
Discharge: HOME | End: 2021-08-19

## 2021-08-20 ENCOUNTER — HOSPITAL ENCOUNTER (OUTPATIENT)
Dept: RADIATION THERAPY | Age: 71
Discharge: HOME | End: 2021-08-20

## 2021-08-23 ENCOUNTER — HOSPITAL ENCOUNTER (OUTPATIENT)
Dept: RADIATION THERAPY | Age: 71
Discharge: HOME | End: 2021-08-23

## 2021-08-24 ENCOUNTER — HOSPITAL ENCOUNTER (OUTPATIENT)
Dept: RADIATION THERAPY | Age: 71
Discharge: HOME | End: 2021-08-24

## 2021-08-25 ENCOUNTER — HOSPITAL ENCOUNTER (OUTPATIENT)
Dept: RADIATION THERAPY | Age: 71
Discharge: HOME | End: 2021-08-25

## 2021-08-26 ENCOUNTER — HOSPITAL ENCOUNTER (OUTPATIENT)
Dept: RADIATION THERAPY | Age: 71
Discharge: HOME | End: 2021-08-26

## 2021-08-27 ENCOUNTER — HOSPITAL ENCOUNTER (OUTPATIENT)
Dept: RADIATION THERAPY | Age: 71
Discharge: HOME | End: 2021-08-27

## 2021-08-30 ENCOUNTER — HOSPITAL ENCOUNTER (OUTPATIENT)
Dept: RADIATION THERAPY | Age: 71
Discharge: HOME | End: 2021-08-30

## 2021-08-31 ENCOUNTER — HOSPITAL ENCOUNTER (OUTPATIENT)
Dept: RADIATION THERAPY | Age: 71
Discharge: HOME | End: 2021-08-31

## 2021-09-01 ENCOUNTER — HOSPITAL ENCOUNTER (OUTPATIENT)
Dept: RADIATION THERAPY | Age: 71
Discharge: HOME | End: 2021-09-01

## 2021-09-02 ENCOUNTER — HOSPITAL ENCOUNTER (OUTPATIENT)
Dept: RADIATION THERAPY | Age: 71
Discharge: HOME | End: 2021-09-02

## 2021-09-03 ENCOUNTER — HOSPITAL ENCOUNTER (OUTPATIENT)
Dept: RADIATION THERAPY | Age: 71
Discharge: HOME | End: 2021-09-03

## 2021-09-08 ENCOUNTER — HOSPITAL ENCOUNTER (OUTPATIENT)
Dept: RADIATION THERAPY | Age: 71
Discharge: HOME | End: 2021-09-08

## 2021-09-09 ENCOUNTER — HOSPITAL ENCOUNTER (OUTPATIENT)
Dept: RADIATION THERAPY | Age: 71
Discharge: HOME | End: 2021-09-09

## 2021-09-10 ENCOUNTER — HOSPITAL ENCOUNTER (OUTPATIENT)
Dept: RADIATION THERAPY | Age: 71
Discharge: HOME | End: 2021-09-10

## 2021-09-13 ENCOUNTER — HOSPITAL ENCOUNTER (OUTPATIENT)
Dept: RADIATION THERAPY | Age: 71
Discharge: HOME | End: 2021-09-13

## 2021-09-14 ENCOUNTER — HOSPITAL ENCOUNTER (OUTPATIENT)
Dept: RADIATION THERAPY | Age: 71
Discharge: HOME | End: 2021-09-14

## 2021-09-15 ENCOUNTER — HOSPITAL ENCOUNTER (OUTPATIENT)
Dept: RADIATION THERAPY | Age: 71
Discharge: HOME | End: 2021-09-15

## 2022-01-25 PROBLEM — C61 PROSTATE CANCER (HCC): Status: RESOLVED | Noted: 2021-02-17 | Resolved: 2022-01-25

## 2022-03-09 ENCOUNTER — TRANSCRIBE ORDER (OUTPATIENT)
Dept: SCHEDULING | Age: 72
End: 2022-03-09

## 2022-03-09 DIAGNOSIS — K51.90 MILD CHRONIC ULCERATIVE COLITIS (HCC): ICD-10-CM

## 2022-03-09 DIAGNOSIS — K59.00 CONSTIPATION: Primary | ICD-10-CM

## 2022-03-09 DIAGNOSIS — R19.5 ABNORMAL FECES: ICD-10-CM

## 2022-03-09 DIAGNOSIS — K62.5 HEMORRHAGE OF RECTUM AND ANUS: ICD-10-CM

## 2022-03-18 PROBLEM — E11.69 TYPE 2 DIABETES MELLITUS WITH HYPERCHOLESTEROLEMIA (HCC): Status: ACTIVE | Noted: 2020-11-15

## 2022-03-18 PROBLEM — E66.01 SEVERE OBESITY (BMI 35.0-39.9) WITH COMORBIDITY (HCC): Status: ACTIVE | Noted: 2018-05-17

## 2022-03-18 PROBLEM — E78.00 TYPE 2 DIABETES MELLITUS WITH HYPERCHOLESTEROLEMIA (HCC): Status: ACTIVE | Noted: 2020-11-15

## 2022-03-18 PROBLEM — K51.00 ULCERATIVE PANCOLITIS WITHOUT COMPLICATION (HCC): Status: ACTIVE | Noted: 2018-05-08

## 2022-03-19 PROBLEM — I77.810 ECTATIC THORACIC AORTA (HCC): Status: ACTIVE | Noted: 2020-10-28

## 2022-04-04 ENCOUNTER — HOSPITAL ENCOUNTER (OUTPATIENT)
Dept: CT IMAGING | Age: 72
Discharge: HOME | End: 2022-04-04
Attending: INTERNAL MEDICINE
Payer: MEDICARE

## 2022-04-04 DIAGNOSIS — R19.5 ABNORMAL FECES: ICD-10-CM

## 2022-04-04 DIAGNOSIS — K62.5 HEMORRHAGE OF RECTUM AND ANUS: ICD-10-CM

## 2022-04-04 DIAGNOSIS — K51.90 MILD CHRONIC ULCERATIVE COLITIS (HCC): ICD-10-CM

## 2022-04-04 DIAGNOSIS — K59.00 CONSTIPATION: ICD-10-CM

## 2022-04-04 PROCEDURE — 74177 CT ABD & PELVIS W/CONTRAST: CPT

## 2022-04-04 PROCEDURE — 74011000636 HC RX REV CODE- 636: Performed by: RADIOLOGY

## 2022-04-04 RX ADMIN — IOPAMIDOL 100 ML: 755 INJECTION, SOLUTION INTRAVENOUS at 15:30

## 2022-04-04 RX ADMIN — IOHEXOL 50 ML: 240 INJECTION, SOLUTION INTRATHECAL; INTRAVASCULAR; INTRAVENOUS; ORAL at 15:30

## 2022-04-04 NOTE — PROCEDURES
[address]  174 [facility], [address] [place], [place] [address]wy              :  Trisha Weeks MD    Referring Provider: Donato Matthews MD    Sedation:  MAC anesthesia Propofol        Prior to the procedure its objectives, risks, consequences and alternatives were discussed with the patient who then elected to proceed. The patient had the opportunity to ask questions and those questions were answered. A physical exam was performed. The heart, lungs, and mental status were examined prior to the procedure and found to be satisfactory for conscious sedation and for the procedure. Conscious sedation was initiated by the physician. Continuous pulse oximetry and blood pressure monitoring were used throughout the procedure. After appropriate analgesia and rectal exam, the colonoscope was passed into the anus and passed to the cecum without difficulty. The prep was good. On slow withdrawal of the scope, the cecum, ascending colon, hepatic flexure, transverse colon, splenic flexure, descending colon, sigmoid and rectum revealed mild edema throughout. Random biopsies were taken in multiple locations to rule out dysplasia. . Retroflexion exam of the rectum was normal. He tolerated the procedure without complication and I recommend a repeat colonoscopy in 3 years. Specimen Random biopsies throughout the colon    Complications: None. EBL:  None.     Trisha Weeks MD  6/13/2018  9:23 AM
Pt is wanting to know if she can either just do a phone call or can she come in if she was diagnosed with Pneumonia. She was in the hospital and she is doing a hospital follow up. She does not have a smart phone.
Visit next week
z pK and vancomycin.
Mariano

## 2022-05-24 PROBLEM — E08.311 DIABETIC RETINOPATHY OF BOTH EYES WITH MACULAR EDEMA ASSOCIATED WITH DIABETES MELLITUS DUE TO UNDERLYING CONDITION (HCC): Status: ACTIVE | Noted: 2022-05-24

## 2022-05-31 NOTE — PROGRESS NOTES
HPI: Shayla Tee (: 1950) is a 74 y.o. male, patient, here for evaluation of the following chief complaint(s):    Hand Pain (Numbness tingling in DENNY hand pain . right worse then left . )  Patient is seen today to evaluate his hands. He has complained of numbness, tingling and paresthesias that appear to best follow median nerve distribution more profound on the right somewhat more than the left hand. He denies any fall or precipitating injury. He has had some mild contractures of the PIP region of both middle fingers unable to fully extend. He does at times complain of pain at the basal joint region of each thumb. He has experienced some nocturnal paresthesias that awaken him from his sleep. Vitals: There were no vitals taken for this visit. There is no height or weight on file to calculate BMI. Allergies   Allergen Reactions    Latex Rash    Adhesive Rash    Cephalexin Unknown (comments)     Unsure of reaction    Pcn [Penicillins] Rash    Zithromax [Azithromycin] Rash       Current Outpatient Medications   Medication Sig    methylPREDNISolone (MEDROL DOSEPACK) 4 mg tablet Per dose pack instructions    potassium chloride (K-DUR, KLOR-CON M20) 20 mEq tablet TAKE 1 TABLET TWICE DAILY    hydroCHLOROthiazide (HYDRODIURIL) 25 mg tablet TAKE 1 TABLET EVERY DAY    trandolapriL (MAVIK) 4 mg tablet TAKE 1 TABLET EVERY DAY    amLODIPine (NORVASC) 5 mg tablet TAKE 1 TABLET EVERY DAY    olodateroL (STRIVERDI) 2.5 mcg/actuation mist Take 2 Puffs by inhalation every twenty-four (24) hours.  dorzolamide (TRUSOPT) 2 % ophthalmic solution Administer 1 drop to both eyes two (2) times a day.  POTASSIUM CHLORIDE PO Take  by mouth two (2) times a day. Take 20 meq qd    latanoprost (XALATAN) 0.005 % ophthalmic solution Administer 1 Drop to both eyes nightly.  multivitamin (ONE A DAY) tablet Take 1 Tab by mouth daily.     cholecalciferol (Vitamin D3) (1000 Units /25 mcg) tablet Take 1,000 Units by mouth daily.  b complex vitamins tablet Take 1 Tab by mouth daily.  salmeteroL (Serevent Diskus) 50 mcg/dose dsdv INHALE 1 PUFF TWICE DAILY    sulfaSALAzine (AZULFIDINE) 500 mg tablet Take 500 mg by mouth four (4) times daily. No current facility-administered medications for this visit.        Past Medical History:   Diagnosis Date    Arthritis     Asthma     Basal cell carcinoma of back     Cancer [address]     SKIN CANCERS-BASAL    Chronic pain     back    Hypertension     Other ill-defined conditions(799.89)     ulcerative cholitis    Other ill-defined conditions(799.89)     ruptured l3-4    Other ill-defined conditions(799.89)     spinal stensis    Ulcerative pancolitis without complication ([facility] 7/2/6346        Past Surgical History:   Procedure Laterality Date    COLONOSCOPY N/A 6/13/2018    COLONOSCOPY performed by Jose Koroma MD at [address] [place] [address] N/A 2/5/2021    COLONOSCOPY   :- performed [facility] Kelly Isabel [facility] ENDOSCOPY    HX COLONOSCOPY      HX HEENT      TONSILS AS A CHILD    HX HEENT      CATARACTS    HX ORTHOPAEDIC  4/70    fussion- l45    HX ORTHOPAEDIC  2015    rods placed [place]    [place] ABDOMEN SURGERY PROC UNLISTED  1983    hernia repair       Family History   Problem Relation Age of Onset    Hypertension Mother     Heart Disease Mother         CHF    Stroke Mother     Inflammatory Bowel Dz Mother     Osteoporosis Mother     Hypertension Father     Parkinsonism Father     Asthma Brother     Hypertension Brother     Psychiatric Disorder Brother         ANXIETY    Asthma Brother     Cancer Paternal Grandfather         colon    Cancer Other         stomach cancer - mother's grandfather    Anesth Problems Neg Hx         Social History     Tobacco Use    Smoking status: Never Smoker    Smokeless tobacco: Never Used   Substance Use Topics    Alcohol use: No    Drug use: No        Review of Systems   All other systems reviewed and are negative. Physical Exam    Both hands demonstrate middle finger PIP contractures of about 30 degrees. He does not have any palpable significant Dupuytren cords. He has some pain and crepitation with grind testing of both thumb carpometacarpal joints. He has more equivocal Tinel's Phalen's and nerve compression testing bilaterally but seems indeed to be a little more symptomatic on the right than the left side. Imaging:    XR Results (most recent):  Results from Appointment encounter on 06/01/22    XR HAND BILAT AP LAT OBL (MIN 3 V)    Narrative  AP, lateral and oblique x-ray of both hands shows bilateral thumb carpometacarpal joint osteoarthritis with narrowing, sclerosis, osteophyte formation loose bodies. There are contractures of both middle finger PIP joints approximately 30 degrees with mild degenerative narrowing of the IP spaces. No fractures. ASSESSMENT/PLAN:  Below is the assessment and plan developed based on review of pertinent history, physical exam, labs, studies, and medications. Patient examination was consistent with bilateral thumb CMC osteoarthritis and presumed carpal tunnel syndrome with bilateral middle finger PIP mild flexion contractures. I recommended EMG assessment for carpal tunnel. He seems to have a component of thenar atrophy on the right side and I have explained to him that if this is indeed is positive he likely would benefit from an open carpal tunnel release possibly to include a right thumb CMC arthroplasty as well. He can trial a Medrol Dosepak. I will see him back once the EMG is completed for review. 1. Bilateral carpal tunnel syndrome  -     methylPREDNISolone (MEDROL DOSEPACK) 4 mg tablet; Per dose pack instructions, Normal, Disp-1 Dose Pack, R-0  2. Primary osteoarthritis of both hands  3. Bilateral hand pain  4.  Primary osteoarthritis of both first carpometacarpal joints  -     XR HAND BILAT AP LAT OBL (MIN 3 V); Future      Return for Follow-up after EMG test completion. An electronic signature was used to authenticate this note.   -- Lucho Avila MD

## 2022-06-01 ENCOUNTER — OFFICE VISIT (OUTPATIENT)
Dept: ORTHOPEDIC SURGERY | Age: 72
End: 2022-06-01
Payer: MEDICARE

## 2022-06-01 DIAGNOSIS — G56.03 BILATERAL CARPAL TUNNEL SYNDROME: Primary | ICD-10-CM

## 2022-06-01 DIAGNOSIS — M18.0 PRIMARY OSTEOARTHRITIS OF BOTH FIRST CARPOMETACARPAL JOINTS: ICD-10-CM

## 2022-06-01 DIAGNOSIS — M19.041 PRIMARY OSTEOARTHRITIS OF BOTH HANDS: ICD-10-CM

## 2022-06-01 DIAGNOSIS — M79.642 BILATERAL HAND PAIN: ICD-10-CM

## 2022-06-01 DIAGNOSIS — M19.042 PRIMARY OSTEOARTHRITIS OF BOTH HANDS: ICD-10-CM

## 2022-06-01 DIAGNOSIS — M79.641 BILATERAL HAND PAIN: ICD-10-CM

## 2022-06-01 PROCEDURE — G8417 CALC BMI ABV UP PARAM F/U: HCPCS | Performed by: ORTHOPAEDIC SURGERY

## 2022-06-01 PROCEDURE — 1101F PT FALLS ASSESS-DOCD LE1/YR: CPT | Performed by: ORTHOPAEDIC SURGERY

## 2022-06-01 PROCEDURE — 3017F COLORECTAL CA SCREEN DOC REV: CPT | Performed by: ORTHOPAEDIC SURGERY

## 2022-06-01 PROCEDURE — G8536 NO DOC ELDER MAL SCRN: HCPCS | Performed by: ORTHOPAEDIC SURGERY

## 2022-06-01 PROCEDURE — G8432 DEP SCR NOT DOC, RNG: HCPCS | Performed by: ORTHOPAEDIC SURGERY

## 2022-06-01 PROCEDURE — 99203 OFFICE O/P NEW LOW 30 MIN: CPT | Performed by: ORTHOPAEDIC SURGERY

## 2022-06-01 PROCEDURE — G8427 DOCREV CUR MEDS BY ELIG CLIN: HCPCS | Performed by: ORTHOPAEDIC SURGERY

## 2022-06-01 PROCEDURE — 1123F ACP DISCUSS/DSCN MKR DOCD: CPT | Performed by: ORTHOPAEDIC SURGERY

## 2022-06-01 RX ORDER — METHYLPREDNISOLONE 4 MG/1
TABLET ORAL
Qty: 1 DOSE PACK | Refills: 0 | Status: SHIPPED | OUTPATIENT
Start: 2022-06-01 | End: 2022-09-20 | Stop reason: ALTCHOICE

## 2022-06-01 NOTE — PATIENT INSTRUCTIONS
Learning About Arthritis at the [facility] of the Thumb  What is it? Arthritis at the base of the thumb joint is wear and tear on the cartilage. Cartilage is a firm, thick, slippery tissue. It covers and protects the ends of bones where they meet to form a joint. When you have arthritis, there are changes in the cartilage that cause it to break down. The bones rub together and cause joint damage and pain. What causes it? Experts don't know what causes arthritis at the base of the thumb. But aging, a lot of use, an injury, or family history may play a part. What are the symptoms? Symptoms of arthritis at the base of the thumb include aching in your joint. Or the pain may feel burning or sharp. You may feel clicking, creaking, or catching in the joint. It may get stiff. You may have more pain and less strength when you pinch or  things. Symptoms may come and go, stay the same, or get worse over time. How is it diagnosed? Your doctor can often diagnose arthritis by asking you questions about your joint pain and other symptoms and examining you. You may also have X-rays and blood tests. Blood tests can help make sure another disease isn't causing your symptoms. How is it treated? Arthritis at the base of your thumb may be treated with rest, pain relievers, steroid medicines, using a brace or splint, and--in some cases--surgery. To help relieve pain in the joint, rest your sore hand. Switch hands for some activities. You can try heat and cold therapy, such as hot compresses, paraffin wax, cold packs, or ice massage. Your doctor may give you a splint to wear during some activities or when pain flares up. You can often manage mild or moderate arthritis pain with over-the-counter pain relievers. These include medicines that reduce swelling, such as ibuprofen or naproxen. You can also use acetaminophen. Sometimes these medicines are in creams that you can rub on your thumb and hand.  Your doctor may also prescribe other medicine for your pain. For some people, steroid shots may be an option. If none of the treatments work, your doctor may discuss surgery with you. Follow-up care is a key part of your treatment and safety. Be sure to make and go to all appointments, and call your doctor if you are having problems. It's also a good idea to know your test results and keep a list of the medicines you take. Where can you learn more? Go to http://www.gray.com/  Enter T110 in the search box to learn more about \"Learning About Arthritis at the [facility] of the Thumb. \"  Current as of: December 20, 2021               Content Version: 13.2  © 2292-3816 [employer]. Care instructions adapted under license by [employer] (which disclaims liability or warranty for this information). If you have questions about a medical condition or this instruction, always ask your healthcare professional. [place] [address] any warranty or liability for your use of this information. Carpal Tunnel Syndrome: Care Instructions  Overview     Carpal tunnel syndrome is numbness, tingling, weakness, and pain in your hand, wrist, and sometimes forearm. It is caused by pressure on the median nerve. This nerve and several tough tissues called tendons run through a space in the wrist. This space is called the carpal tunnel. The repeated hand motions used in work and some hobbies and sports can put pressure on the median nerve. Pregnancy can cause carpal tunnel syndrome. Several conditions, such as diabetes, arthritis, and an underactive thyroid, can also cause it. You may be able to limit an activity or change the way you do it to reduce your symptoms. You also can take other steps to feel better. If your symptoms are mild, 1 to 2 weeks of home treatment are likely to ease your pain. Surgery is needed only if other treatments do not work.   Follow-up care is a key part of your treatment and safety. Be sure to make and go to all appointments, and call your doctor if you are having problems. It's also a good idea to know your test results and keep a list of the medicines you take. How can you care for yourself at home? · If possible, stop or reduce the activity that causes your symptoms. If you cannot stop the activity, take frequent breaks to rest and stretch or change hand positions to do a task. Try switching hands, such as when using a computer mouse. · Try to avoid bending or twisting your wrists. · Ask your doctor if you can take an over-the-counter pain medicine, such as acetaminophen (Tylenol), ibuprofen (Advil, Motrin), or naproxen (Aleve). Be safe with medicines. Read and follow all instructions on the label. · If your doctor prescribes corticosteroid medicine to help reduce pain and swelling, take it exactly as prescribed. Call your doctor if you think you are having a problem with your medicine. · Put ice or a cold pack on your wrist for 10 to 20 minutes at a time to ease pain. Put a thin cloth between the ice and your skin. · If your doctor or your physical or occupational therapist tells you to wear a wrist splint, wear it as directed to keep your wrist in a neutral position. This also eases pressure on your median nerve. · Ask your doctor whether you should have physical or occupational therapy to learn how to do tasks differently. · Try a yoga class to stretch your muscles and build strength in your hands and wrists. Yoga has been shown to ease carpal tunnel symptoms. To prevent carpal tunnel  · When working at a computer, keep your hands and wrists in line with your forearms. Hold your elbows close to your sides. Take a break every 10 to 15 minutes. · Try these exercises:  ? Warm up: Rotate your wrist up, down, and from side to side. Repeat this 4 times. Stretch your fingers far apart, relax them, then stretch them again. Repeat 4 times.  Stretch your thumb by pulling it back gently, holding it, and then releasing it. Repeat 4 times. ? Prayer stretch: Start with your palms together in front of your chest just below your chin. Slowly lower your hands toward your waistline while keeping your hands close to your stomach and your palms together until you feel a mild to moderate stretch under your forearms. Hold for 10 to 20 seconds. Repeat 4 times. ? Wrist flexor stretch: Hold your arm in front of you with your palm up. Bend your wrist, pointing your hand toward the floor. With your other hand, gently bend your wrist further until you feel a mild to moderate stretch in your forearm. Hold for 10 to 20 seconds. Repeat 4 times. ? Wrist extensor stretch: Repeat the steps for the wrist flexor stretch, but begin with your extended hand palm down. · Squeeze a rubber exercise ball several times a day to keep your hands and fingers strong. · Avoid holding objects (such as a book) in one position for a long time. When possible, use your whole hand to grasp an object. Using just the thumb and index finger can put stress on the wrist.  · Do not smoke. It can make this condition worse by reducing blood flow to the median nerve. If you need help quitting, talk to your doctor about stop-smoking programs and medicines. These can increase your chances of quitting for good. When should you call for help? Watch closely for changes in your health, and be sure to contact your doctor if:    · Your pain or other problems do not get better with home care.     · You want more information about physical or occupational therapy.     · You have side effects of your corticosteroid medicine, such as:  ? Weight gain. ? Mood changes. ? Trouble sleeping. ? Bruising easily.     · You have any other problems with your medicine. Where can you learn more?   Go to http://www.gray.com/  Enter R432 in the search box to learn more about \"Carpal Tunnel Syndrome: Care Instructions. \"  Current as of: July 1, 2021               Content Version: 13.2  © 2707-1397 [employer]. Care instructions adapted under license by [employer] (which disclaims liability or warranty for this information). If you have questions about a medical condition or this instruction, always ask your healthcare professional. [place] [address] any warranty or liability for your use of this information. Electromyogram (EMG) and Nerve Conduction Studies: About These Tests  What are they? An electromyogram (EMG) measures the electrical activity of your muscles when you are not using them (at rest) and when you tighten them (muscle contraction). Nerve conduction studies (NCS) measure how well and how fast the nerves can send electrical signals. EMG and nerve conduction studies are often done together. If they are done together, the nerve conduction studies are done before the EMG. Why are they done? You may need an EMG to find diseases that damage your muscles or nerves or to find why you can't move your muscles (paralysis), why they feel weak, or why they twitch. These problems may include a herniated disc, amyotrophic lateral sclerosis (ALS), or myasthenia gravis (MG). You may need nerve conduction studies to find damage to the nerves that lead from the brain and spinal cord to the rest of the body. (This is called the peripheral nervous system.) These studies are often used to help find nerve disorders, such as carpal tunnel syndrome. How do you prepare for these tests?    · Wear loose-fitting clothing. You may be given a hospital gown to wear.     · The electrodes for the test are attached to your skin. Your skin needs to be clean and free of sprays, oils, creams, and lotions.     · You may be asked to sign a consent form that says you understand the risks of the test and agree to have it done.     · Tell your doctor ALL the medicines, vitamins, supplements, and herbal remedies you take. Some may increase the risk of problems during your test. Your doctor will tell you if you should stop taking any of them before the test and how soon to do it.     · If you take aspirin or some other blood thinner, ask your doctor if you should stop taking it before your test. Make sure that you understand exactly what your doctor wants you to do. These medicines increase the risk of bleeding. How are the tests done? You lie on a table or bed or sit in a reclining chair so your muscles are relaxed. For an EMG:   · Your doctor will insert a needle electrode into a muscle. This will record the electrical activity while the muscle is at rest.  · Your doctor will ask you to tighten the same muscle slowly and steadily while the electrical activity is recorded. · Your doctor may move the electrode to a different area of the muscle or a different muscle. For nerve conduction studies:   · Your doctor will attach two types of electrodes to your skin. ? One type of electrode is placed over a nerve and will give the nerve an electrical pulse. ? The other type of electrode is placed over the muscle that the nerve controls. It will record how long it takes the muscle to react to the electrical pulse. How does having electromyogram (EMG) and nerve conduction studies feel? During an EMG test, you may feel a quick, sharp pain when the needle electrode is put into a muscle. With nerve conduction studies, you will be able to feel the electrical pulses. The tests make some people anxious. Keep in mind that only a very low-voltage electrical current is used. And each electrical pulse is very quick. It lasts less than a second. How long do they take? · An EMG may take 30 to 60 minutes. · Nerve conduction tests may take from 15 minutes to 1 hour or more. It depends on how many nerves and muscles your doctor tests. What happens after these tests?   · After the test, you may be sore and feel a tingling in your muscles. This may last for up to 2 days. · If any of the test areas are sore:  ? Put ice or a cold pack on the area for 10 to 20 minutes at a time. Put a thin cloth between the ice and your skin. ? Take an over-the-counter pain medicine, such as acetaminophen (Tylenol), ibuprofen (Advil, Motrin), or naproxen (Aleve). Be safe with medicines. Read and follow all instructions on the label. · You will probably be able to go home right away. It depends on the reason for the test.  · You can go back to your usual activities right away. When should you call for help? Watch closely for changes in your health, and be sure to contact your doctor if:  · Muscle pain from an EMG test gets worse or you have swelling, tenderness, or pus at any of the needle sites. · You have any problems that you think may be from the test.  · You have any questions about the test or have not received your results. Follow-up care is a key part of your treatment and safety. Be sure to make and go to all appointments, and call your doctor if you are having problems. It's also a good idea to keep a list of the medicines you take. Ask your doctor when you can expect to have your test results. Where can you learn more? Go to http://www.gray.com/  Enter [id number] in the search box to learn more about \"Electromyogram (EMG) and Nerve Conduction Studies: About These Tests. \"  Current as of: December 13, 2021               Content Version: 13.2  © 2006-2022 [employer]. Care instructions adapted under license by [employer] (which disclaims liability or warranty for this information). If you have questions about a medical condition or this instruction, always ask your healthcare professional. [address] any warranty or liability for your use of this information.

## 2022-06-01 NOTE — LETTER
6/1/2022    Patient: Char Rodriguez   YOB: 1950   Date of Visit: 6/1/2022     Dakota Aviles MD  [address] [place] [id number]  [address] [place]    Dear Dakota Aviles MD,      Thank you for referring Mr. Neetu Howell to [facility] for evaluation. My notes for this consultation are attached. If you have questions, please do not hesitate to call me. I look forward to following your patient along with you.       Sincerely,    Lit Sanders MD

## 2022-06-15 ENCOUNTER — HOSPITAL ENCOUNTER (OUTPATIENT)
Dept: RADIATION THERAPY | Age: 72
Discharge: HOME | End: 2022-06-15

## 2022-06-16 ENCOUNTER — OFFICE VISIT (OUTPATIENT)
Dept: ORTHOPEDIC SURGERY | Age: 72
End: 2022-06-16
Payer: MEDICARE

## 2022-06-16 VITALS — BODY MASS INDEX: 31.99 KG/M2 | HEIGHT: 69 IN | WEIGHT: 216 LBS

## 2022-06-16 DIAGNOSIS — M54.12 CERVICAL RADICULOPATHY: Primary | ICD-10-CM

## 2022-06-16 PROCEDURE — G8427 DOCREV CUR MEDS BY ELIG CLIN: HCPCS | Performed by: ORTHOPAEDIC SURGERY

## 2022-06-16 PROCEDURE — 99203 OFFICE O/P NEW LOW 30 MIN: CPT | Performed by: ORTHOPAEDIC SURGERY

## 2022-06-16 PROCEDURE — 3017F COLORECTAL CA SCREEN DOC REV: CPT | Performed by: ORTHOPAEDIC SURGERY

## 2022-06-16 PROCEDURE — 1101F PT FALLS ASSESS-DOCD LE1/YR: CPT | Performed by: ORTHOPAEDIC SURGERY

## 2022-06-16 PROCEDURE — G8432 DEP SCR NOT DOC, RNG: HCPCS | Performed by: ORTHOPAEDIC SURGERY

## 2022-06-16 PROCEDURE — G8417 CALC BMI ABV UP PARAM F/U: HCPCS | Performed by: ORTHOPAEDIC SURGERY

## 2022-06-16 PROCEDURE — 1123F ACP DISCUSS/DSCN MKR DOCD: CPT | Performed by: ORTHOPAEDIC SURGERY

## 2022-06-16 PROCEDURE — G8536 NO DOC ELDER MAL SCRN: HCPCS | Performed by: ORTHOPAEDIC SURGERY

## 2022-06-16 NOTE — PROGRESS NOTES
Shaun Bonilla (: 1950) is a 74 y.o. male, established patient, here for evaluation of the following chief complaint(s):  Leg Pain (bilateral weakness in legs )       ASSESSMENT/PLAN:  Below is the assessment and plan developed based on review of pertinent history, physical exam, labs, studies, and medications. Findings were discussed with the patient today. Based on his balance issues and bilateral hand numbness/tingling I do have concerns for a cervical radiculopathy cause and we will obtain an MRI which will help us with further treatment planning including the possibility of surgical treatment. Patient will follow up after this MRI is performed. 1. Cervical radiculopathy  -     XR SPINE CERV PA LAT ODONT 3 V MAX; Future      Return for imaging results after study performed. SUBJECTIVE/OBJECTIVE:  Shaun Bonilla (: 1950) is a 74 y.o. male. He notes weakness and instability in the legs. He denies any significant leg or back pain. He does have a history of a lumbar fusion. He has also seen our hand specialist for bilateral hand numbness and tingling. Allergies   Allergen Reactions    Latex Rash    Adhesive Rash    Cephalexin Unknown (comments)     Unsure of reaction    Pcn [Penicillins] Rash    Zithromax [Azithromycin] Rash       Current Outpatient Medications   Medication Sig    methylPREDNISolone (MEDROL DOSEPACK) 4 mg tablet Per dose pack instructions    potassium chloride (K-DUR, KLOR-CON M20) 20 mEq tablet TAKE 1 TABLET TWICE DAILY    hydroCHLOROthiazide (HYDRODIURIL) 25 mg tablet TAKE 1 TABLET EVERY DAY    trandolapriL (MAVIK) 4 mg tablet TAKE 1 TABLET EVERY DAY    amLODIPine (NORVASC) 5 mg tablet TAKE 1 TABLET EVERY DAY    olodateroL (STRIVERDI) 2.5 mcg/actuation mist Take 2 Puffs by inhalation every twenty-four (24) hours.  multivitamin (ONE A DAY) tablet Take 1 Tab by mouth daily.     cholecalciferol (Vitamin D3) (1000 Units /25 mcg) tablet Take 1,000 Units by mouth daily.  b complex vitamins tablet Take 1 Tab by mouth daily.  salmeteroL (Serevent Diskus) 50 mcg/dose dsdv INHALE 1 PUFF TWICE DAILY    sulfaSALAzine (AZULFIDINE) 500 mg tablet Take 500 mg by mouth four (4) times daily.  dorzolamide (TRUSOPT) 2 % ophthalmic solution Administer 1 drop to both eyes two (2) times a day.  POTASSIUM CHLORIDE PO Take  by mouth two (2) times a day. Take 20 meq qd    latanoprost (XALATAN) 0.005 % ophthalmic solution Administer 1 Drop to both eyes nightly. No current facility-administered medications for this visit. Social History     Socioeconomic History    Marital status:      Spouse name: Not on file    Number of children: Not on file    Years of education: Not on file    Highest education level: Not on file   Occupational History    Not on file   Tobacco Use    Smoking status: Never Smoker    Smokeless tobacco: Never Used   Substance and Sexual Activity    Alcohol use: No    Drug use: No    Sexual activity: Not on file   Other Topics Concern    Not on file   Social History Narrative    Not on file     Social Determinants of Health     Financial Resource Strain:     Difficulty of Paying Living Expenses: Not on file   Food Insecurity:     Worried About Running Out of Food in the Last Year: Not on file    Phyllis of Food in the Last Year: Not on file   Transportation Needs:     Lack of Transportation (Medical): Not on file    Lack of Transportation (Non-Medical):  Not on file   Physical Activity:     Days of Exercise per Week: Not on file    Minutes of Exercise per Session: Not on file   Stress:     Feeling of Stress : Not on file   Social Connections:     Frequency of Communication with Friends and Family: Not on file    Frequency of Social Gatherings with Friends and Family: Not on file    Attends [employer] Services: Not on file    Active Member of Clubs or Organizations: Not on file    Attends Club or Organization Meetings: Not on file    Marital Status: Not on file   Intimate Partner Violence:     Fear of Current or Ex-Partner: Not on file    Emotionally Abused: Not on file    Physically Abused: Not on file    Sexually Abused: Not on file   Housing Stability:     Unable to Pay for Housing in the Last Year: Not on file    Number of [place] in the Last Year: Not on file    Unstable Housing in the Last Year: Not on file       Past Surgical History:   Procedure Laterality Date    COLONOSCOPY N/A 6/13/2018    COLONOSCOPY performed by Ishan Hernandez MD at [place] [address]. N/A 2/5/2021    COLONOSCOPY   :- performed by Alyse Corrales [facility] at [facility] ENDOSCOPY    HX COLONOSCOPY      HX HEENT      TONSILS AS A CHILD    HX HEENT      CATARACTS    HX ORTHOPAEDIC  4/70    fussion- l45    HX ORTHOPAEDIC  2015    rods placed [place] [facility]    SD ABDOMEN SURGERY [address]    hernia repair       Family History   Problem Relation Age of Onset    Hypertension Mother     Heart Disease Mother         CHF    Stroke Mother     Inflammatory Bowel Dz Mother     Osteoporosis Mother     Hypertension Father     Parkinsonism Father     Asthma Brother     Hypertension Brother     Psychiatric Disorder Brother         ANXIETY    Asthma Brother     Cancer Paternal Grandfather         colon    Cancer Other         stomach cancer - mother's grandfather    Anesth Problems Neg Hx                REVIEW OF SYSTEMS:    Patient denies any recent fever, chills, nausea, vomiting, chest pain, or shortness of breath. Vitals:  Ht 5' 9\" (1.753 m)   Wt 216 lb (98 kg)   BMI 31.90 kg/m²    Body mass index is 31.9 kg/m². PHYSICAL EXAM:  General exam: Patient is awake, alert, and oriented x3. Well-appearing. No acute distress. Ambulates with a slightly antalgic gait. Neck: There is tenderness to palpation in the paraspinal region. No obvious midline tenderness to palpation.   There is stiffness with flexion and extension of the cervical spine. Negative Spurling's exam.  No erythema or ecchymosis. There is normal range of motion of both shoulders and minimal pain with impingement testing including Zabala exam.  Grossly neurovascular and sensory intact. IMAGING:    XR Results (most recent):  Results from Appointment encounter on 06/16/22    XR SPINE CERV PA LAT ODONT 3 V MAX    Narrative  X-rays of the cervical spine 2 views done today show evidence of disc space narrowing at the mid cervical spine. There is evidence of straightening of the normal cervical lordosis. Results from Appointment encounter on 06/01/22    XR HAND BILAT AP LAT OBL (MIN 3 V)    Narrative  AP, lateral and oblique x-ray of both hands shows bilateral thumb carpometacarpal joint osteoarthritis with narrowing, sclerosis, osteophyte formation loose bodies. There are contractures of both middle finger PIP joints approximately 30 degrees with mild degenerative narrowing of the IP spaces. No fractures. Results from [place] encounter on 01/09/19    XR KNEE RT 3 V    Narrative  EXAM: XR KNEE RT 3 V    INDICATION: trauma. Fall with right knee pain    COMPARISON: None. FINDINGS: Three views of the right knee demonstrate no fracture or other acute  osseous or articular abnormality. There is no effusion. Impression  IMPRESSION: No acute abnormality. Orders Placed This Encounter    XR SPINE CERV 2- 3 VWS     2c     Standing Status:   Future     Number of Occurrences:   1     Standing Expiration Date:   6/16/2023              An electronic signature was used to authenticate this note.   -- Radha Rucker DO

## 2022-06-30 ENCOUNTER — OFFICE VISIT (OUTPATIENT)
Dept: ORTHOPEDIC SURGERY | Age: 72
End: 2022-06-30
Payer: MEDICARE

## 2022-06-30 VITALS — HEIGHT: 69 IN | BODY MASS INDEX: 31.99 KG/M2 | WEIGHT: 216 LBS

## 2022-06-30 DIAGNOSIS — G56.03 BILATERAL CARPAL TUNNEL SYNDROME: ICD-10-CM

## 2022-06-30 DIAGNOSIS — M54.12 CERVICAL RADICULOPATHY: Primary | ICD-10-CM

## 2022-06-30 PROCEDURE — 3017F COLORECTAL CA SCREEN DOC REV: CPT | Performed by: ORTHOPAEDIC SURGERY

## 2022-06-30 PROCEDURE — 99213 OFFICE O/P EST LOW 20 MIN: CPT | Performed by: ORTHOPAEDIC SURGERY

## 2022-06-30 PROCEDURE — G8427 DOCREV CUR MEDS BY ELIG CLIN: HCPCS | Performed by: ORTHOPAEDIC SURGERY

## 2022-06-30 PROCEDURE — G8432 DEP SCR NOT DOC, RNG: HCPCS | Performed by: ORTHOPAEDIC SURGERY

## 2022-06-30 PROCEDURE — G8536 NO DOC ELDER MAL SCRN: HCPCS | Performed by: ORTHOPAEDIC SURGERY

## 2022-06-30 PROCEDURE — 1101F PT FALLS ASSESS-DOCD LE1/YR: CPT | Performed by: ORTHOPAEDIC SURGERY

## 2022-06-30 PROCEDURE — 1123F ACP DISCUSS/DSCN MKR DOCD: CPT | Performed by: ORTHOPAEDIC SURGERY

## 2022-06-30 PROCEDURE — G8417 CALC BMI ABV UP PARAM F/U: HCPCS | Performed by: ORTHOPAEDIC SURGERY

## 2022-06-30 RX ORDER — TRIAMCINOLONE ACETONIDE 1 MG/G
OINTMENT TOPICAL
COMMUNITY
Start: 2022-06-13

## 2022-06-30 NOTE — LETTER
6/30/2022    Patient: Jessee Crowder   YOB: 1950   Date of Visit: 6/30/2022     Vicente Ojeda MD  [address] [place] [address]  [address] [facility] [address]    Dear Vicente Ojeda MD,      Thank you for referring Mr. Carlito Granados to [facility] for evaluation. My notes for this consultation are attached. If you have questions, please do not hesitate to call me. I look forward to following your patient along with you.       Sincerely,    Ed Osorio, DO

## 2022-06-30 NOTE — PROGRESS NOTES
Bailee Santoyo (: 1950) is a 74 y.o. male, established patient, here for evaluation of the following chief complaint(s):  Neck Pain (neck MRI results)       ASSESSMENT/PLAN:  Below is the assessment and plan developed based on review of pertinent history, physical exam, labs, studies, and medications. He has a scheduled nerve conduction test/EMG for . He is then going to follow-up with our hand specialist.  This will help in determining how much of his symptoms are coming from cervical radiculopathy versus carpal tunnel syndrome. I did not see anything obvious on his cervical spine MRI that would indicate a cause of his balance issues with his lower extremities. He has been through treatment of his prostate cancer we discussed that this versus his previous lumbar spine issues could be a cause of some of his leg weakness. He will follow-up with Dr. Quoc Storey and then will consider follow-up with our spine specialist if needed in the future. I will see him back as needed. 1. Cervical radiculopathy  2. Bilateral carpal tunnel syndrome      Return if symptoms worsen or fail to improve. SUBJECTIVE/OBJECTIVE:  Bailee Santoyo (: 1950) is a 74 y.o. male. He notes improvement in his hand numbness and tingling since he was given a Medrol Dosepak recently. He continues with some activity modifications and home exercises. He does note some continued weakness in the legs but denies any bowel or bladder issues.         Allergies   Allergen Reactions    Latex Rash    Adhesive Rash    Cephalexin Unknown (comments)     Unsure of reaction    Pcn [Penicillins] Rash    Zithromax [Azithromycin] Rash       Current Outpatient Medications   Medication Sig    triamcinolone acetonide (KENALOG) 0.1 % ointment APPLY OINTMENT TWICE DAILY TO AFFECTED AREAS AS NEEDED FOR ITCHING    Striverdi Respimat 2.5 mcg/actuation mist INHALE 2 PUFFS EVERY DAY    methylPREDNISolone (MEDROL DOSEPACK) 4 mg tablet Per dose pack instructions    potassium chloride (K-DUR, KLOR-CON M20) 20 mEq tablet TAKE 1 TABLET TWICE DAILY    hydroCHLOROthiazide (HYDRODIURIL) 25 mg tablet TAKE 1 TABLET EVERY DAY    trandolapriL (MAVIK) 4 mg tablet TAKE 1 TABLET EVERY DAY    amLODIPine (NORVASC) 5 mg tablet TAKE 1 TABLET EVERY DAY    multivitamin (ONE A DAY) tablet Take 1 Tab by mouth daily.  cholecalciferol (Vitamin D3) (1000 Units /25 mcg) tablet Take 1,000 Units by mouth daily.  b complex vitamins tablet Take 1 Tab by mouth daily.  salmeteroL (Serevent Diskus) 50 mcg/dose dsdv INHALE 1 PUFF TWICE DAILY    sulfaSALAzine (AZULFIDINE) 500 mg tablet Take 500 mg by mouth four (4) times daily.  dorzolamide (TRUSOPT) 2 % ophthalmic solution Administer 1 drop to both eyes two (2) times a day.  POTASSIUM CHLORIDE PO Take  by mouth two (2) times a day. Take 20 meq qd    latanoprost (XALATAN) 0.005 % ophthalmic solution Administer 1 Drop to both eyes nightly. No current facility-administered medications for this visit. Social History     Socioeconomic History    Marital status:      Spouse name: Not on file    Number of children: Not on file    Years of education: Not on file    Highest education level: Not on file   Occupational History    Not on file   Tobacco Use    Smoking status: Never Smoker    Smokeless tobacco: Never Used   Substance and Sexual Activity    Alcohol use: No    Drug use: No    Sexual activity: Not on file   Other Topics Concern    Not on file   Social History Narrative    Not on file     Social Determinants of Health     Financial Resource Strain:     Difficulty of Paying Living Expenses: Not on file   Food Insecurity:     Worried About Running Out of Food in the Last Year: Not on file    Phyllis of Food in the Last Year: Not on file   Transportation Needs:     Lack of Transportation (Medical): Not on file    Lack of Transportation (Non-Medical):  Not on file   Physical Activity:     Days of Exercise per Week: Not on file    Minutes of Exercise per Session: Not on file   Stress:     Feeling of Stress : Not on file   Social Connections:     Frequency of Communication with Friends and Family: Not on file    Frequency of Social Gatherings with Friends and Family: Not on file    Attends Oriental orthodox Services: Not on file    Active Member of [address] [place] [address] [employer] or Organizations: Not on file    Attends Club or Organization Meetings: Not on file    Marital Status: Not on file   Intimate Partner Violence:     Fear of Current or Ex-Partner: Not on file    Emotionally Abused: Not on file    Physically Abused: Not on file    Sexually Abused: Not on file   Housing Stability:     Unable to Pay for Housing in the Last Year: Not on file    Number of [place] in the Last Year: Not on file    Unstable Housing in the Last Year: Not on file       Past Surgical History:   Procedure Laterality Date    COLONOSCOPY N/A 6/13/2018    COLONOSCOPY performed by Debbie Padilla MD at [place] [address]. N/A 2/5/2021    COLONOSCOPY   :- [facility] Del [facility]    HX COLONOSCOPY      HX HEENT      TONSILS AS A CHILD    HX HEENT      CATARACTS    HX ORTHOPAEDIC  4/70    fussion- l45    HX ORTHOPAEDIC  2015    rods placed [place] [facility]    CT ABDOMEN SURGERY [address]    hernia repair       Family History   Problem Relation Age of Onset    Hypertension Mother     Heart Disease Mother         CHF    Stroke Mother     Inflammatory Bowel Dz Mother     Osteoporosis Mother     Hypertension Father     Parkinsonism Father     Asthma Brother     Hypertension Brother     Psychiatric Disorder Brother         ANXIETY    Asthma Brother     Cancer Paternal Grandfather         colon    Cancer Other         stomach cancer - mother's grandfather    Anesth Problems Neg Hx                REVIEW OF SYSTEMS:    Patient denies any recent fever, chills, nausea, vomiting, chest pain, or shortness of breath. Vitals:  Ht 5' 9\" (1.753 m)   Wt 216 lb (98 kg)   BMI 31.90 kg/m²    Body mass index is 31.9 kg/m². PHYSICAL EXAM:  General exam: Patient is awake, alert, and oriented x3. Well-appearing. No acute distress. Ambulates with an antalgic gait    Neck: There is tenderness to palpation in the paraspinal region. No obvious midline tenderness to palpation. There is stiffness with flexion and extension of the cervical spine. Negative Spurling's exam.  No erythema or ecchymosis. There is normal range of motion of both shoulders and minimal pain with impingement testing including Zabala exam.  He does have some decreased sensation into the median nerve distribution of the hands. IMAGING:  MRI Results (most recent):  Results from Appointment encounter on 06/20/22    MRI CERV SPINE WO CONT    Narrative  EXAM:  MRI CERV SPINE WO CONT    INDICATION:   NECK PAIN    COMPARISON: Cervical spine radiograph 6/16/2022. TECHNIQUE: Multiplanar multisequence acquisition without contrast of the  cervical spine. CONTRAST: None. FINDINGS:  There is normal alignment of the cervical spine. Vertebral body heights are  maintained without evidence of acute fracture. Mild degenerative endplate marrow  edema at C5-C6 and C6-C7 (Modic type I). Marrow signal is otherwise within  normal limits. Multilevel degenerative disc disease as detailed below. The  cervical cord is normal in size and signal. Region of the foramen magnum is  unremarkable. Visualized soft tissues are unremarkable. C2-C3: No significant disc herniation, spinal canal or neural foraminal  stenosis. C3-C4: Diffuse disc osteophyte complex with bilateral uncovertebral spurring. Mild spinal canal stenosis. Mild to moderate bilateral neural foraminal  stenosis. C4-C5: Diffuse disc osteophyte complex with bilateral uncovertebral spurring. Mild spinal canal stenosis.  No significant neural foraminal stenosis. C5-C6: Diffuse disc osteophyte complex with bilateral uncovertebral spurring. No  significant spinal canal stenosis. Mild bilateral neural foraminal stenosis. C6-C7: Diffuse disc osteophyte complex bilateral uncovertebral spurring, left  worse than right. No significant spinal canal stenosis. Moderate left and mild  right neural foraminal stenosis. C7-T1: Mild bilateral facet arthropathy. No significant disc herniation, spinal  canal or neural foraminal stenosis. T1-T2: Diffuse disc bulge. No significant spinal canal stenosis. Mild to  moderate bilateral neural foraminal stenosis. Impression  1. Mild spinal canal stenosis and mild to moderate bilateral neural foraminal  stenosis at C3-C4. 2. Mild spinal canal stenosis at C4-C5. 3. Moderate left neural foraminal stenosis at C6-C7. 4. Remaining degenerative changes as detailed above. XR Results (most recent):  Results from Appointment encounter on 06/16/22    XR SPINE CERV PA LAT ODONT 3 V MAX    Narrative  X-rays of the cervical spine 2 views done today show evidence of disc space narrowing at the mid cervical spine. There is evidence of straightening of the normal cervical lordosis. Results from Appointment encounter on 06/01/22    XR HAND BILAT AP LAT OBL (MIN 3 V)    Narrative  AP, lateral and oblique x-ray of both hands shows bilateral thumb carpometacarpal joint osteoarthritis with narrowing, sclerosis, osteophyte formation loose bodies. There are contractures of both middle finger PIP joints approximately 30 degrees with mild degenerative narrowing of the IP spaces. No fractures. Results from [place] encounter on 01/09/19    XR KNEE RT 3 V    Narrative  EXAM: XR KNEE RT 3 V    INDICATION: trauma. Fall with right knee pain    COMPARISON: None. FINDINGS: Three views of the right knee demonstrate no fracture or other acute  osseous or articular abnormality.  There is no effusion. Impression  IMPRESSION: No acute abnormality. No orders of the defined types were placed in this encounter. An electronic signature was used to authenticate this note.   -- Joanne Richards, DO

## 2022-07-20 NOTE — PROGRESS NOTES
HPI: Geovanny Cannon (: 1950) is a 74 y.o. male, patient, here for evaluation of the following chief complaint(s):    No chief complaint on file. Patient is seen today to evaluate his hands. He has complained of numbness, tingling and paresthesias that appear to best follow median nerve distribution more profound on the right somewhat more than the left hand. He denies any fall or precipitating injury. He has had some mild contractures of the PIP region of both middle fingers unable to fully extend. He does at times complain of pain at the basal joint region of each thumb. He has experienced some nocturnal paresthesias that awaken him from his sleep. EMG assessment by Dr. Chanell Johnson on 2022 showed very severe bilateral wrist carpal tunnel syndrome. Vitals: There were no vitals taken for this visit. There is no height or weight on file to calculate BMI. Allergies   Allergen Reactions    Latex Rash    Adhesive Rash    Cephalexin Unknown (comments)     Unsure of reaction    Pcn [Penicillins] Rash    Zithromax [Azithromycin] Rash       Current Outpatient Medications   Medication Sig    triamcinolone acetonide (KENALOG) 0.1 % ointment APPLY OINTMENT TWICE DAILY TO AFFECTED AREAS AS NEEDED FOR ITCHING    Striverdi Respimat 2.5 mcg/actuation mist INHALE 2 PUFFS EVERY DAY    methylPREDNISolone (MEDROL DOSEPACK) 4 mg tablet Per dose pack instructions    potassium chloride (K-DUR, KLOR-CON M20) 20 mEq tablet TAKE 1 TABLET TWICE DAILY    hydroCHLOROthiazide (HYDRODIURIL) 25 mg tablet TAKE 1 TABLET EVERY DAY    trandolapriL (MAVIK) 4 mg tablet TAKE 1 TABLET EVERY DAY    amLODIPine (NORVASC) 5 mg tablet TAKE 1 TABLET EVERY DAY    multivitamin (ONE A DAY) tablet Take 1 Tab by mouth daily. cholecalciferol (Vitamin D3) (1000 Units /25 mcg) tablet Take 1,000 Units by mouth daily. b complex vitamins tablet Take 1 Tab by mouth daily.     salmeteroL (Serevent Diskus) 50 mcg/dose dsdv INHALE 1 PUFF TWICE DAILY    sulfaSALAzine (AZULFIDINE) 500 mg tablet Take 500 mg by mouth four (4) times daily. dorzolamide (TRUSOPT) 2 % ophthalmic solution Administer 1 drop to both eyes two (2) times a day. POTASSIUM CHLORIDE PO Take  by mouth two (2) times a day. Take 20 meq qd    latanoprost (XALATAN) 0.005 % ophthalmic solution Administer 1 Drop to both eyes nightly. No current facility-administered medications for this visit.        Past Medical History:   Diagnosis Date    Arthritis     Asthma     Basal cell carcinoma of back     Cancer (HCC)     SKIN CANCERS-BASAL    Chronic pain     back    Hypertension     Other ill-defined conditions(799.89)     ulcerative cholitis    Other ill-defined conditions(799.89)     ruptured l3-4    Other ill-defined conditions(799.89)     spinal stensis    Ulcerative pancolitis without complication ([facility] [address] 7/6/8512        Past Surgical History:   Procedure Laterality Date    COLONOSCOPY N/A 6/13/2018    COLONOSCOPY performed by Bo Brenner MD at [facility] ENDOSCOPY    COLONOSCOPY N/A 2/5/2021    COLONOSCOPY   :- performed by Kendell Ramirez MD at [facility] ENDOSCOPY    HX COLONOSCOPY      HX HEENT      TONSILS AS A CHILD    HX HEENT      CATARACTS    HX [address]  4/70    fussion- l45    HX ORTHOPAEDIC  2015    rods placed [place] [facility]    [place] ABDOMEN SURGERY [address]    hernia repair       Family History   Problem Relation Age of Onset    Hypertension Mother     Heart Disease Mother         CHF    Stroke Mother     Inflammatory Bowel Dz Mother     Osteoporosis Mother     Hypertension Father     Parkinsonism Father     Asthma Brother     Hypertension Brother     Psychiatric Disorder Brother         ANXIETY    Asthma Brother     Cancer Paternal Grandfather         colon    Cancer Other         stomach cancer - mother's grandfather    Anesth Problems Neg Hx         Social History     Tobacco Use    Smoking status: Never    Smokeless tobacco: Never   Substance Use Topics    Alcohol use: No    Drug use: No        Review of Systems   All other systems reviewed and are negative. Physical Exam    Both hands demonstrate middle finger PIP contractures of about 30 degrees. He does not have any palpable significant Dupuytren cords. He has some pain and crepitation with grind testing of both thumb carpometacarpal joints. He has more equivocal Tinel's Phalen's and nerve compression testing bilaterally but seems indeed to be a little more symptomatic on the right than the left side. He has atrophy in the thenar eminence on the right especially. Imaging:    XR Results (most recent):  Results from Appointment encounter on 06/16/22    XR SPINE CERV PA LAT ODONT 3 V MAX    Narrative  X-rays of the cervical spine 2 views done today show evidence of disc space narrowing at the mid cervical spine. There is evidence of straightening of the normal cervical lordosis. ASSESSMENT/PLAN:  Below is the assessment and plan developed based on review of pertinent history, physical exam, labs, studies, and medications. Patient examination was consistent with bilateral thumb CMC osteoarthritis and presumed carpal tunnel syndrome with bilateral middle finger PIP mild flexion contractures. I recommended EMG assessment for carpal tunnel. He seems to have a component of thenar atrophy on the right side and I have explained to him that if this is indeed is positive he likely would benefit from an open carpal tunnel release possibly to include a right thumb CMC arthroplasty as well. He currently wants to hold off on the thumb Aia 16 arthroplasty and instead proceed with a right wrist open carpal tunnel release. He may then in the future consider left side procedure. I reviewed risks that include but not limited to stiffness, pain, nerve or tendon damage and incomplete relief of pain. Arrangements can made for this to be performed on an outpatient basis soon as possible.     1. Bilateral carpal tunnel syndrome  2. Primary osteoarthritis of both hands  3. Bilateral hand pain  4. Primary osteoarthritis of both first carpometacarpal joints  5. Cervical radiculopathy      No follow-ups on file. An electronic signature was used to authenticate this note.   -- Tania Ragsdale MD

## 2022-07-21 ENCOUNTER — TRANSCRIBE ORDER (OUTPATIENT)
Dept: REGISTRATION | Age: 72
End: 2022-07-21

## 2022-07-21 ENCOUNTER — HOSPITAL ENCOUNTER (OUTPATIENT)
Dept: NON INVASIVE DIAGNOSTICS | Age: 72
Discharge: HOME | End: 2022-07-21
Payer: MEDICARE

## 2022-07-21 ENCOUNTER — OFFICE VISIT (OUTPATIENT)
Dept: ORTHOPEDIC SURGERY | Age: 72
End: 2022-07-21
Payer: MEDICARE

## 2022-07-21 DIAGNOSIS — M19.042 PRIMARY OSTEOARTHRITIS OF BOTH HANDS: ICD-10-CM

## 2022-07-21 DIAGNOSIS — G56.03 BILATERAL CARPAL TUNNEL SYNDROME: Primary | ICD-10-CM

## 2022-07-21 DIAGNOSIS — Z41.9 ELECTIVE SURGERY: Primary | ICD-10-CM

## 2022-07-21 DIAGNOSIS — M19.041 PRIMARY OSTEOARTHRITIS OF BOTH HANDS: ICD-10-CM

## 2022-07-21 DIAGNOSIS — M79.641 BILATERAL HAND PAIN: ICD-10-CM

## 2022-07-21 DIAGNOSIS — M54.12 CERVICAL RADICULOPATHY: ICD-10-CM

## 2022-07-21 DIAGNOSIS — Z41.9 ELECTIVE SURGERY: ICD-10-CM

## 2022-07-21 DIAGNOSIS — M79.642 BILATERAL HAND PAIN: ICD-10-CM

## 2022-07-21 DIAGNOSIS — M18.0 PRIMARY OSTEOARTHRITIS OF BOTH FIRST CARPOMETACARPAL JOINTS: ICD-10-CM

## 2022-07-21 LAB
ATRIAL RATE: 62 BPM
CALCULATED P AXIS, ECG09: 53 DEGREES
CALCULATED R AXIS, ECG10: 0 DEGREES
CALCULATED T AXIS, ECG11: 32 DEGREES
DIAGNOSIS, 93000: NORMAL
P-R INTERVAL, ECG05: 168 MS
Q-T INTERVAL, ECG07: 416 MS
QRS DURATION, ECG06: 80 MS
QTC CALCULATION (BEZET), ECG08: 422 MS
VENTRICULAR RATE, ECG03: 62 BPM

## 2022-07-21 PROCEDURE — 1101F PT FALLS ASSESS-DOCD LE1/YR: CPT | Performed by: ORTHOPAEDIC SURGERY

## 2022-07-21 PROCEDURE — G8432 DEP SCR NOT DOC, RNG: HCPCS | Performed by: ORTHOPAEDIC SURGERY

## 2022-07-21 PROCEDURE — 93005 ELECTROCARDIOGRAM TRACING: CPT

## 2022-07-21 PROCEDURE — 99213 OFFICE O/P EST LOW 20 MIN: CPT | Performed by: ORTHOPAEDIC SURGERY

## 2022-07-21 PROCEDURE — G8428 CUR MEDS NOT DOCUMENT: HCPCS | Performed by: ORTHOPAEDIC SURGERY

## 2022-07-21 PROCEDURE — G8417 CALC BMI ABV UP PARAM F/U: HCPCS | Performed by: ORTHOPAEDIC SURGERY

## 2022-07-21 PROCEDURE — 3017F COLORECTAL CA SCREEN DOC REV: CPT | Performed by: ORTHOPAEDIC SURGERY

## 2022-07-21 PROCEDURE — G8536 NO DOC ELDER MAL SCRN: HCPCS | Performed by: ORTHOPAEDIC SURGERY

## 2022-07-21 PROCEDURE — 1123F ACP DISCUSS/DSCN MKR DOCD: CPT | Performed by: ORTHOPAEDIC SURGERY

## 2022-07-21 NOTE — LETTER
7/21/2022    Patient: Roger Westbrook   YOB: 1950   Date of Visit: 7/21/2022     Rocio Thornton MD  [address] [place] [address]  [facility] [address]    Dear Rocio Thornton MD,      Thank you for referring Mr. Luisa Chase to [facility] for evaluation. My notes for this consultation are attached. If you have questions, please do not hesitate to call me. I look forward to following your patient along with you.       Sincerely,    Eloina Wick MD

## 2022-07-21 NOTE — PATIENT INSTRUCTIONS
Carpal Tunnel Syndrome: Care Instructions  Overview     Carpal tunnel syndrome is numbness, tingling, weakness, and pain in your hand, wrist, and sometimes forearm. It is caused by pressure on the median nerve. This nerve and several tough tissues called tendons run through a space in the wrist. This space is called the carpal tunnel. The repeated hand motions used in work and some hobbies and sports can put pressure on the median nerve. Pregnancy can cause carpal tunnel syndrome. Several conditions, such as diabetes, arthritis, and an underactive thyroid, can also cause it. You may be able to limit an activity or change the way you do it to reduce your symptoms. You also can take other steps to feel better. If your symptoms are mild, 1 to 2 weeks of home treatment are likely to ease your pain. Surgery is needed only if other treatments do not work. Follow-up care is a key part of your treatment and safety. Be sure to make and go to all appointments, and call your doctor if you are having problems. It's also a good idea to know your test results and keep a list of the medicines you take. How can you care for yourself at home? If possible, stop or reduce the activity that causes your symptoms. If you cannot stop the activity, take frequent breaks to rest and stretch or change hand positions to do a task. Try switching hands, such as when using a computer mouse. Try to avoid bending or twisting your wrists. Ask your doctor if you can take an over-the-counter pain medicine, such as acetaminophen (Tylenol), ibuprofen (Advil, Motrin), or naproxen (Aleve). Be safe with medicines. Read and follow all instructions on the label. If your doctor prescribes corticosteroid medicine to help reduce pain and swelling, take it exactly as prescribed. Call your doctor if you think you are having a problem with your medicine. Put ice or a cold pack on your wrist for 10 to 20 minutes at a time to ease pain.  Put a thin cloth between the ice and your skin. If your doctor or your physical or occupational therapist tells you to wear a wrist splint, wear it as directed to keep your wrist in a neutral position. This also eases pressure on your median nerve. Ask your doctor whether you should have physical or occupational therapy to learn how to do tasks differently. Try a yoga class to stretch your muscles and build strength in your hands and wrists. Yoga has been shown to ease carpal tunnel symptoms. To prevent carpal tunnel  When working at a computer, keep your hands and wrists in line with your forearms. Hold your elbows close to your sides. Take a break every 10 to 15 minutes. Try these exercises:  Warm up: Rotate your wrist up, down, and from side to side. Repeat this 4 times. Stretch your fingers far apart, relax them, then stretch them again. Repeat 4 times. Stretch your thumb by pulling it back gently, holding it, and then releasing it. Repeat 4 times. Prayer stretch: Start with your palms together in front of your chest just below your chin. Slowly lower your hands toward your waistline while keeping your hands close to your stomach and your palms together until you feel a mild to moderate stretch under your forearms. Hold for 10 to 20 seconds. Repeat 4 times. Wrist flexor stretch: Hold your arm in front of you with your palm up. Bend your wrist, pointing your hand toward the floor. With your other hand, gently bend your wrist further until you feel a mild to moderate stretch in your forearm. Hold for 10 to 20 seconds. Repeat 4 times. Wrist extensor stretch: Repeat the steps for the wrist flexor stretch, but begin with your extended hand palm down. Squeeze a rubber exercise ball several times a day to keep your hands and fingers strong. Avoid holding objects (such as a book) in one position for a long time. When possible, use your whole hand to grasp an object.  Using just the thumb and index finger can put stress on the wrist.  Do not smoke. It can make this condition worse by reducing blood flow to the median nerve. If you need help quitting, talk to your doctor about stop-smoking programs and medicines. These can increase your chances of quitting for good. When should you call for help? Watch closely for changes in your health, and be sure to contact your doctor if:    Your pain or other problems do not get better with home care. You want more information about physical or occupational therapy. You have side effects of your corticosteroid medicine, such as:  Weight gain. Mood changes. Trouble sleeping. Bruising easily. You have any other problems with your medicine. Where can you learn more? Go to http://www.gray.com/  Enter R432 in the search box to learn more about \"Carpal Tunnel Syndrome: Care Instructions. \"  Current as of: July 1, 2021               Content Version: 13.2  © 0374-5505 [employer]. Care instructions adapted under license by [employer] (which disclaims liability or warranty for this information). If you have questions about a medical condition or this instruction, always ask your healthcare professional. [address] any warranty or liability for your use of this information.

## 2022-07-22 ENCOUNTER — HOSPITAL ENCOUNTER (EMERGENCY)
Age: 72
Discharge: HOME | End: 2022-07-22
Attending: EMERGENCY MEDICINE
Payer: MEDICARE

## 2022-07-22 ENCOUNTER — APPOINTMENT (OUTPATIENT)
Dept: VASCULAR SURGERY | Age: 72
End: 2022-07-22
Attending: EMERGENCY MEDICINE
Payer: MEDICARE

## 2022-07-22 VITALS
HEART RATE: 73 BPM | RESPIRATION RATE: 18 BRPM | OXYGEN SATURATION: 97 % | DIASTOLIC BLOOD PRESSURE: 87 MMHG | TEMPERATURE: 98.4 F | SYSTOLIC BLOOD PRESSURE: 150 MMHG

## 2022-07-22 DIAGNOSIS — L03.114 CELLULITIS OF LEFT UPPER EXTREMITY: Primary | ICD-10-CM

## 2022-07-22 PROCEDURE — 93971 EXTREMITY STUDY: CPT

## 2022-07-22 PROCEDURE — 99283 EMERGENCY DEPT VISIT LOW MDM: CPT

## 2022-07-22 RX ORDER — CLINDAMYCIN HYDROCHLORIDE 300 MG/1
300 CAPSULE ORAL 4 TIMES DAILY
Qty: 28 CAPSULE | Refills: 0 | Status: SHIPPED | OUTPATIENT
Start: 2022-07-22 | End: 2022-07-29

## 2022-07-22 NOTE — ED TRIAGE NOTES
Pt arrives for rash to left forearm and feels like left wrist is strained after waking this morning. Pt denies any known injury but has plan for carpal tunnel surgery in August. Pain 3/10.

## 2022-07-22 NOTE — ED PROVIDER NOTES
Please note that this dictation was completed with [employer], the computer voice recognition software. Quite often unanticipated grammatical, syntax, homophones, and other interpretive errors are inadvertently transcribed by the computer software. Please disregard these errors. Please excuse any errors that have escaped final proofreading. Patient is a 73-year-old male with history of ulcerative colitis, hypertension, asthma, arthritis, carpal tunnel syndrome, presenting to ED for evaluation of redness to his left forearm and wrist which he first noticed this morning. At times the rash feels itchy and other times he feels like pain. Notes that he had a test for his carpal tunnel 1 week ago and is concerned he may have an infection. Is scheduled for carpal tunnel surgery with Dr. Indio Alaniz in August.  Denies fever, chills, body aches, or any additional medical complaints at this time.        Past Medical History:   Diagnosis Date    Arthritis     Asthma     Basal cell carcinoma of back     Cancer (HCC)     SKIN CANCERS-BASAL    Chronic pain     back    Hypertension     Other ill-defined conditions(799.89)     ulcerative cholitis    Other ill-defined conditions(799.89)     ruptured l3-4    Other ill-defined conditions(799.89)     spinal stensis    Ulcerative pancolitis without complication ([facility] [address] 6/2/3008       Past Surgical History:   Procedure Laterality Date    COLONOSCOPY N/A 6/13/2018    COLONOSCOPY performed by Dean Figueroa MD at [facility] ENDOSCOPY    COLONOSCOPY N/A 2/5/2021    COLONOSCOPY   :- performed by Shoshana Runner, MD at [facility] ENDOSCOPY    HX COLONOSCOPY      HX HEENT      TONSILS AS A CHILD    HX HEENT      CATARACTS    HX [address]  4/70    fussion- l45    HX ORTHOPAEDIC  2015    rods placed [facility]    [place] ABDOMEN SURGERY PROC UNLISTED  1983    hernia repair         Family History:   Problem Relation Age of Onset    Hypertension Mother     Heart Disease Mother         CHF    Stroke Mother Inflammatory Bowel Dz Mother     Osteoporosis Mother     Hypertension Father     Parkinsonism Father     Asthma Brother     Hypertension Brother     Psychiatric Disorder Brother         ANXIETY    Asthma Brother     Cancer Paternal Grandfather         colon    Cancer Other         stomach cancer - mother's grandfather    Anesth Problems Neg Hx        Social History     Socioeconomic History    Marital status:      Spouse name: Not on file    Number of children: Not on file    Years of education: Not on file    Highest education level: Not on file   Occupational History    Not on file   Tobacco Use    Smoking status: Never    Smokeless tobacco: Never   Substance and Sexual Activity    Alcohol use: No    Drug use: No    Sexual activity: Not on file   Other Topics Concern    Not on file   Social History Narrative    Not on file     Social Determinants of Health     Financial Resource Strain: Not on file   Food Insecurity: Not on file   Transportation Needs: Not on file   Physical Activity: Not on file   Stress: Not on file   Social Connections: Not on file   Intimate Partner Violence: Not on file   Housing Stability: Not on file         ALLERGIES: Latex, Adhesive, Cephalexin, Pcn [penicillins], and Zithromax [azithromycin]    Review of Systems   Constitutional:  Negative for chills and fever. HENT:  Negative for congestion, ear pain and sore throat. Eyes:  Negative for visual disturbance. Respiratory:  Negative for cough and shortness of breath. Cardiovascular:  Negative for chest pain. Gastrointestinal:  Negative for abdominal pain, diarrhea, nausea and vomiting. Genitourinary:  Negative for dysuria and flank pain. Musculoskeletal:  Negative for back pain. Skin:  Positive for color change. Neurological:  Negative for dizziness and headaches. Psychiatric/Behavioral:  Negative for confusion.       Vitals:    07/22/22 1240   BP: (!) 150/87   Pulse: 73   Resp: 18   Temp: 98.4 °F (36.9 °C) SpO2: 97%            Physical Exam  Vitals and nursing note reviewed. Constitutional:       General: He is not in acute distress. Appearance: Normal appearance. He is not ill-appearing. HENT:      Head: Normocephalic and atraumatic. Eyes:      General: Vision grossly intact. Extraocular Movements: Extraocular movements intact. Conjunctiva/sclera: Conjunctivae normal.   Neck:      Trachea: Phonation normal.   Cardiovascular:      Rate and Rhythm: Normal rate and regular rhythm. Heart sounds: Normal heart sounds. Pulmonary:      Effort: Pulmonary effort is normal.      Breath sounds: Normal breath sounds and air entry. Abdominal:      Palpations: Abdomen is soft. Tenderness: There is no abdominal tenderness. Musculoskeletal:         General: Normal range of motion. Skin:     General: Skin is warm and dry. Findings: Erythema (erythema to the left volar lateral wrist with proximal streaking up the forearm. Nontender. No edema. Normal ROM of wrist. Radial pulses equal bilaterally.) present. Neurological:      General: No focal deficit present. Mental Status: He is alert and oriented to person, place, and time. Psychiatric:         Behavior: Behavior normal.            MDM  Number of Diagnoses or Management Options  Cellulitis of left upper extremity  Diagnosis management comments: Patient is alert, afebrile, vital stable. Presents with streaking erythema from the left wrist to the left proximal forearm with onset today. Recent nerve conduction testing on the left forearm 7/12. No signs of septic arthritis. Superficial thrombophlebitis versus cellulitis. Ultrasound negative for DVT or superficial thrombophlebitis. Will start on antibiotics, will start on clindamycin as he is allergic to penicillin and cephalosporins. Discharged from ED in stable condition.   Strict return precautions outlined to return to ED with any worsening of erythema, fever, chills, or any other concerning symptoms. All questions answered at this time. Amount and/or Complexity of Data Reviewed  Discuss the patient with other providers: yes (Discussed patient with ED attending Rachel Delgado MD who agrees with current management plan.     )    2:56 PM  Pt has been reevaluated. There are no new complaints, changes, or physical findings at this time. All results have been reviewed with patient and/or family. Medications have been reviewed w/ pt and/or family. Pt and/or family's questions have been answered. Pt and/or family expressed good understanding of the dx/tx/rx and is in agreement with plan of care. Pt instructed and agreed to f/u w/ PCP and ortho and to return to ED upon further deterioration. Return precautions outlined. All questions answered at this time. Pt is stable and ready for discharge. IMPRESSION:  1. Cellulitis of left upper extremity        PLAN:  1. Current Discharge Medication List        START taking these medications    Details   clindamycin (CLEOCIN) 300 mg capsule Take 1 Capsule by mouth four (4) times daily for 7 days. Qty: 28 Capsule, Refills: 0  Start date: 7/22/2022, End date: 7/29/2022           2.    Follow-up Information       Follow up With Specialties Details Why Contact Kimber Tejeda MD Family Medicine Schedule an appointment as soon as possible for a visit   [address] [place] [address] [phone]      Roya Dacosta MD Hand Surgery Physician, Orthopedic Surgery Go to  As scheduled [address] [facility] [facility] [address] [facility] Emergency Medicine Go to  If symptoms worsen [address] [facility]  [phone]              Return to ED if worse          Procedures

## 2022-07-25 DIAGNOSIS — G56.01 RIGHT CARPAL TUNNEL SYNDROME: Primary | ICD-10-CM

## 2022-08-16 DIAGNOSIS — G56.03 BILATERAL CARPAL TUNNEL SYNDROME: Primary | ICD-10-CM

## 2022-08-16 RX ORDER — TRAMADOL HYDROCHLORIDE 50 MG/1
50 TABLET ORAL
Qty: 15 TABLET | Refills: 0 | Status: SHIPPED | OUTPATIENT
Start: 2022-08-16 | End: 2022-08-23

## 2022-08-29 NOTE — PROGRESS NOTES
HPI: Germaine Johnson (: 1950) is a 74 y.o. male, patient, here for evaluation of the following chief complaint(s):    Surgical Follow-up (Right hand open carpal tunnel release on 22.)  Patient is seen today to evaluate his hands. He has complained of numbness, tingling and paresthesias that appear to best follow median nerve distribution more profound on the right somewhat more than the left hand. He denies any fall or precipitating injury. He has had some mild contractures of the PIP region of both middle fingers unable to fully extend. He does at times complain of pain at the basal joint region of each thumb. He has experienced some nocturnal paresthesias that awaken him from his sleep. EMG assessment by Dr. Herman Corley on 2022 showed very severe bilateral wrist carpal tunnel syndrome. He underwent a right open carpal tunnel release on 2022. Vitals: There were no vitals taken for this visit. There is no height or weight on file to calculate BMI. Allergies   Allergen Reactions    Latex Rash    Adhesive Rash    Cephalexin Unknown (comments)     Unsure of reaction    Pcn [Penicillins] Rash    Zithromax [Azithromycin] Rash       Current Outpatient Medications   Medication Sig    triamcinolone acetonide (KENALOG) 0.1 % ointment APPLY OINTMENT TWICE DAILY TO AFFECTED AREAS AS NEEDED FOR ITCHING    Striverdi Respimat 2.5 mcg/actuation mist INHALE 2 PUFFS EVERY DAY    methylPREDNISolone (MEDROL DOSEPACK) 4 mg tablet Per dose pack instructions    potassium chloride (K-DUR, KLOR-CON M20) 20 mEq tablet TAKE 1 TABLET TWICE DAILY    hydroCHLOROthiazide (HYDRODIURIL) 25 mg tablet TAKE 1 TABLET EVERY DAY    trandolapriL (MAVIK) 4 mg tablet TAKE 1 TABLET EVERY DAY    amLODIPine (NORVASC) 5 mg tablet TAKE 1 TABLET EVERY DAY    multivitamin (ONE A DAY) tablet Take 1 Tab by mouth daily. cholecalciferol (Vitamin D3) (1000 Units /25 mcg) tablet Take 1,000 Units by mouth daily.     b complex vitamins tablet Take 1 Tab by mouth daily. salmeteroL (Serevent Diskus) 50 mcg/dose dsdv INHALE 1 PUFF TWICE DAILY    sulfaSALAzine (AZULFIDINE) 500 mg tablet Take 500 mg by mouth four (4) times daily. dorzolamide (TRUSOPT) 2 % ophthalmic solution Administer 1 drop to both eyes two (2) times a day. POTASSIUM CHLORIDE PO Take  by mouth two (2) times a day. Take 20 meq qd    latanoprost (XALATAN) 0.005 % ophthalmic solution Administer 1 Drop to both eyes nightly. No current facility-administered medications for this visit.        Past Medical History:   Diagnosis Date    Arthritis     Asthma     Basal cell carcinoma of back     Cancer (HCC)     SKIN CANCERS-BASAL    Chronic pain     back    Hypertension     Other ill-defined conditions(799.89)     ulcerative cholitis    Other ill-defined conditions(799.89)     ruptured l3-4    Other ill-defined conditions(799.89)     spinal stensis    Ulcerative pancolitis without complication ([facility] [address] 2/1/2653        Past Surgical History:   Procedure Laterality Date    COLONOSCOPY N/A 6/13/2018    COLONOSCOPY performed by Bo Brenner MD at [facility] ENDOSCOPY    COLONOSCOPY N/A 2/5/2021    COLONOSCOPY   :- performed by Kendell Ramirez MD at [facility] ENDOSCOPY    HX COLONOSCOPY      HX HEENT      TONSILS AS A CHILD    HX HEENT      CATARACTS    HX [address]  4/70    fussion- l45    HX ORTHOPAEDIC  2015    rods placed [facility]    [place] ABDOMEN SURGERY [address]    hernia repair       Family History   Problem Relation Age of Onset    Hypertension Mother     Heart Disease Mother         CHF    Stroke Mother     Inflammatory Bowel Dz Mother     Osteoporosis Mother     Hypertension Father     Parkinsonism Father     Asthma Brother     Hypertension Brother     Psychiatric Disorder Brother         ANXIETY    Asthma Brother     Cancer Paternal Grandfather         colon    Cancer Other         stomach cancer - mother's grandfather    Anesth Problems Neg Hx         Social History Tobacco Use    Smoking status: Never    Smokeless tobacco: Never   Substance Use Topics    Alcohol use: No    Drug use: No        Review of Systems   All other systems reviewed and are negative. Physical Exam    Both hands demonstrate middle finger PIP contractures of about 30 degrees. He does not have any palpable significant Dupuytren cords. He has some pain and crepitation with grind testing of both thumb carpometacarpal joints. He has atrophy in the thenar eminence on the right especially. His right carpal tunnel wound is healing well without redness drainage or sign infection. Sensation seems improving. Imaging:    No x-rays today. ASSESSMENT/PLAN:  Below is the assessment and plan developed based on review of pertinent history, physical exam, labs, studies, and medications. Patient examination was consistent with bilateral thumb CMC osteoarthritis and presumed carpal tunnel syndrome with bilateral middle finger PIP mild flexion contractures. I recommended EMG assessment for carpal tunnel. He seems to have a component of thenar atrophy on the right side and I have explained to him that if this is indeed is positive he likely would benefit from an open carpal tunnel release possibly to include a right thumb CMC arthroplasty as well. He currently wants to hold off on the thumb [facility] arthroplasty and instead proceed with a right wrist open carpal tunnel release. As, he underwent a right open carpal tunnel release on 8/18/2022. He may continue with simple wound care, gentle motion and strength. Follow-up in 3 to 4 weeks. He is considering a left side procedure in the new year. 1. Primary osteoarthritis of both hands  2. Bilateral hand pain  3. Bilateral carpal tunnel syndrome  4. Primary osteoarthritis of both first carpometacarpal joints  5. Status post carpal tunnel release      Return in about 4 weeks (around 9/27/2022).     An electronic signature was used to authenticate this note.  -- Ulices Triana MD

## 2022-08-30 ENCOUNTER — OFFICE VISIT (OUTPATIENT)
Dept: ORTHOPEDIC SURGERY | Age: 72
End: 2022-08-30
Payer: MEDICARE

## 2022-08-30 DIAGNOSIS — M18.0 PRIMARY OSTEOARTHRITIS OF BOTH FIRST CARPOMETACARPAL JOINTS: ICD-10-CM

## 2022-08-30 DIAGNOSIS — M19.042 PRIMARY OSTEOARTHRITIS OF BOTH HANDS: Primary | ICD-10-CM

## 2022-08-30 DIAGNOSIS — M19.041 PRIMARY OSTEOARTHRITIS OF BOTH HANDS: Primary | ICD-10-CM

## 2022-08-30 DIAGNOSIS — M79.641 BILATERAL HAND PAIN: ICD-10-CM

## 2022-08-30 DIAGNOSIS — M79.642 BILATERAL HAND PAIN: ICD-10-CM

## 2022-08-30 DIAGNOSIS — Z98.890 STATUS POST CARPAL TUNNEL RELEASE: ICD-10-CM

## 2022-08-30 DIAGNOSIS — G56.03 BILATERAL CARPAL TUNNEL SYNDROME: ICD-10-CM

## 2022-08-30 PROCEDURE — 99024 POSTOP FOLLOW-UP VISIT: CPT | Performed by: ORTHOPAEDIC SURGERY

## 2022-08-30 NOTE — LETTER
8/30/2022    Patient: Noemi George   YOB: 1950   Date of Visit: 8/30/2022     Rob Lopez MD  [address] [place] [address]  [place]    Dear Rob Lopez MD,      Thank you for referring Mr. Portia Dietz to [facility] for evaluation. My notes for this consultation are attached. If you have questions, please do not hesitate to call me. I look forward to following your patient along with you.       Sincerely,    Marce Howard MD

## 2022-08-30 NOTE — PATIENT INSTRUCTIONS
Wrist: Exercises  Introduction  Here are some examples of exercises for you to try. The exercises may be suggested for a condition or for rehabilitation. Start each exercise slowly. Ease off the exercises if you start to have pain. You will be told when to start these exercises and which ones will work best for you. How to do the exercises  Prayer stretch    Start with your palms together in front of your chest just below your chin. Slowly lower your hands toward your waistline, keeping your hands close to your stomach and your palms together until you feel a mild to moderate stretch under your forearms. Hold for at least 15 to 30 seconds. Repeat 2 to 4 times. Wrist flexor stretch    Extend your arm in front of you with your palm up. Bend your wrist, pointing your hand toward the floor. With your other hand, gently bend your wrist farther until you feel a mild to moderate stretch in your forearm. Hold for at least 15 to 30 seconds. Repeat 2 to 4 times. Wrist extensor stretch    Repeat steps 1 through 4 of the stretch above, but begin with your extended hand palm down. Follow-up care is a key part of your treatment and safety. Be sure to make and go to all appointments, and call your doctor if you are having problems. It's also a good idea to know your test results and keep a list of the medicines you take. Where can you learn more? Go to http://www.gray.com/  Enter Z598 in the search box to learn more about \"Wrist: Exercises. \"  Current as of: July 1, 2021               Content Version: 13.2  © 1004-9815 [employer]. Care instructions adapted under license by [employer] (which disclaims liability or warranty for this information). If you have questions about a medical condition or this instruction, always ask your healthcare professional. [place] [address] any warranty or liability for your use of this information.

## 2022-10-25 ENCOUNTER — OFFICE VISIT (OUTPATIENT)
Dept: ORTHOPEDIC SURGERY | Age: 72
End: 2022-10-25
Payer: MEDICARE

## 2022-10-25 DIAGNOSIS — G56.03 BILATERAL CARPAL TUNNEL SYNDROME: ICD-10-CM

## 2022-10-25 DIAGNOSIS — M79.642 BILATERAL HAND PAIN: ICD-10-CM

## 2022-10-25 DIAGNOSIS — M79.641 BILATERAL HAND PAIN: ICD-10-CM

## 2022-10-25 DIAGNOSIS — M19.042 PRIMARY OSTEOARTHRITIS OF BOTH HANDS: Primary | ICD-10-CM

## 2022-10-25 DIAGNOSIS — Z98.890 STATUS POST CARPAL TUNNEL RELEASE: ICD-10-CM

## 2022-10-25 DIAGNOSIS — M19.041 PRIMARY OSTEOARTHRITIS OF BOTH HANDS: Primary | ICD-10-CM

## 2022-10-25 DIAGNOSIS — M18.0 PRIMARY OSTEOARTHRITIS OF BOTH FIRST CARPOMETACARPAL JOINTS: ICD-10-CM

## 2022-10-25 PROCEDURE — 99024 POSTOP FOLLOW-UP VISIT: CPT | Performed by: ORTHOPAEDIC SURGERY

## 2022-10-25 NOTE — PROGRESS NOTES
HPI: Marisa Clinton (: 1950) is a 74 y.o. male, patient, here for evaluation of the following chief complaint(s):    Surgical Follow-up (Right hand open carpal tunnel release on 22.)  Patient is seen today to evaluate his hands. He has complained of numbness, tingling and paresthesias that appear to best follow median nerve distribution more profound on the right somewhat more than the left hand. He denies any fall or precipitating injury. He has had some mild contractures of the PIP region of both middle fingers unable to fully extend. He does at times complain of pain at the basal joint region of each thumb. He has experienced some nocturnal paresthesias that awaken him from his sleep. EMG assessment by Dr. Go Bullock on 2022 showed very severe bilateral wrist carpal tunnel syndrome. He underwent a right open carpal tunnel release on 2022. Vitals: There were no vitals taken for this visit. There is no height or weight on file to calculate BMI. Allergies   Allergen Reactions    Latex Rash    Adhesive Rash    Cephalexin Unknown (comments)     Unsure of reaction    Pcn [Penicillins] Rash    Zithromax [Azithromycin] Rash       Current Outpatient Medications   Medication Sig    triamcinolone acetonide (KENALOG) 0.1 % ointment APPLY OINTMENT TWICE DAILY TO AFFECTED AREAS AS NEEDED FOR ITCHING    Striverdi Respimat 2.5 mcg/actuation mist INHALE 2 PUFFS EVERY DAY    potassium chloride (K-DUR, KLOR-CON M20) 20 mEq tablet TAKE 1 TABLET TWICE DAILY    hydroCHLOROthiazide (HYDRODIURIL) 25 mg tablet TAKE 1 TABLET EVERY DAY    trandolapriL (MAVIK) 4 mg tablet TAKE 1 TABLET EVERY DAY    amLODIPine (NORVASC) 5 mg tablet TAKE 1 TABLET EVERY DAY    multivitamin (ONE A DAY) tablet Take 1 Tab by mouth daily. cholecalciferol (Vitamin D3) (1000 Units /25 mcg) tablet Take 1,000 Units by mouth daily. b complex vitamins tablet Take 1 Tab by mouth daily.     sulfaSALAzine (AZULFIDINE) 500 mg tablet Take 500 mg by mouth four (4) times daily. dorzolamide (TRUSOPT) 2 % ophthalmic solution Administer 1 drop to both eyes two (2) times a day. POTASSIUM CHLORIDE PO Take  by mouth two (2) times a day. Take 20 meq qd    latanoprost (XALATAN) 0.005 % ophthalmic solution Administer 1 Drop to both eyes nightly. No current facility-administered medications for this visit.        Past Medical History:   Diagnosis Date    Arthritis     Asthma     Basal cell carcinoma of back     Cancer ([facility] [address]     SKIN CANCERS-BASAL    Chronic pain     back    Hypertension     Other ill-defined conditions(799.89)     ulcerative cholitis    Other ill-defined conditions(799.89)     ruptured l3-4    Other ill-defined conditions(799.89)     spinal stensis    Ulcerative pancolitis without complication ([facility] [address] 6/5/9726        Past Surgical History:   Procedure Laterality Date    COLONOSCOPY N/A 6/13/2018    COLONOSCOPY performed by Olegario Smith MD at [facility] ENDOSCOPY    COLONOSCOPY N/A 2/5/2021    COLONOSCOPY   :- performed by Jeevan Rosa MD at [facility] ENDOSCOPY    HX COLONOSCOPY      HX HEENT      TONSILS AS A CHILD    HX HEENT      CATARACTS    HX [address]  4/70    fussion- l45    HX ORTHOPAEDIC  2015    rods placed [facility]    [place]BDOMEN SURGERY [address]    hernia repair       Family History   Problem Relation Age of Onset    Hypertension Mother     Heart Disease Mother         CHF    Stroke Mother     Inflammatory Bowel Dz Mother     Osteoporosis Mother     Hypertension Father     Parkinsonism Father     Asthma Brother     Hypertension Brother     Psychiatric Disorder Brother         ANXIETY    Asthma Brother     Cancer Paternal Grandfather         colon    Cancer Other         stomach cancer - mother's grandfather    Anesth Problems Neg Hx         Social History     Tobacco Use    Smoking status: Never    Smokeless tobacco: Never   Substance Use Topics    Alcohol use: No    Drug use: No        Review of Systems   All other systems reviewed and are negative. Physical Exam    Both hands demonstrate middle finger PIP contractures of about 30 degrees. He does not have any palpable significant Dupuytren cords. He has some pain and crepitation with grind testing of both thumb carpometacarpal joints. He has atrophy in the thenar eminence on the right especially. His right carpal tunnel wound is well-healed without redness drainage or sign infection. Sensation seems improving. Imaging:    No x-rays today. ASSESSMENT/PLAN:  Below is the assessment and plan developed based on review of pertinent history, physical exam, labs, studies, and medications. Patient examination was consistent with bilateral thumb CMC osteoarthritis and presumed carpal tunnel syndrome with bilateral middle finger PIP mild flexion contractures. I recommended EMG assessment for carpal tunnel. He seems to have a component of thenar atrophy on the right side and I have explained to him that if this is indeed is positive he likely would benefit from an open carpal tunnel release possibly to include a right thumb CMC arthroplasty as well. He currently wants to hold off on the thumb [facility] arthroplasty and instead proceed with a right wrist open carpal tunnel release. He underwent a right open carpal tunnel release on 8/18/2022. He may continue with simple wound care, gentle motion and strength. He is considering a left side procedure in the new year. I reviewed risks and benefits and answered questions regarding a left open carpal tunnel release that he is considering in February 2023. Arrangements can be made for this to be performed at his convenience. Meanwhile he may continue with motion and strengthening for the right hand and is pleased with continued sensory improvement. 1. Primary osteoarthritis of both hands  2. Bilateral carpal tunnel syndrome  3. Primary osteoarthritis of both first carpometacarpal joints  4.  Status post carpal tunnel release  5. Bilateral hand pain      Return in about 4 weeks (around 11/22/2022). An electronic signature was used to authenticate this note.   -- Alexandra Martinez MD

## 2022-10-25 NOTE — LETTER
10/25/2022    Patient: Pat Leija   YOB: 1950   Date of Visit: 10/25/2022     Marisol Carlin MD  [address] [facility] [address]  [address] [facility] [address]    Dear Marisol Carlin MD,      Thank you for referring Mr. Amol Tamez to [facility] for evaluation. My notes for this consultation are attached. If you have questions, please do not hesitate to call me. I look forward to following your patient along with you.       Sincerely,    Teodora Carrillo MD

## 2022-12-13 DIAGNOSIS — G56.03 BILATERAL CARPAL TUNNEL SYNDROME: Primary | ICD-10-CM

## 2023-02-21 ENCOUNTER — TELEPHONE (OUTPATIENT)
Dept: ORTHOPEDIC SURGERY | Age: 73
End: 2023-02-21

## 2023-02-21 DIAGNOSIS — G56.03 BILATERAL CARPAL TUNNEL SYNDROME: Primary | ICD-10-CM

## 2023-02-21 RX ORDER — IBUPROFEN 800 MG/1
800 TABLET ORAL
Qty: 15 TABLET | Refills: 0 | Status: SHIPPED | OUTPATIENT
Start: 2023-02-21

## 2023-03-01 NOTE — PROGRESS NOTES
HPI: Kaylin Mendoza (: 1950) is a 74 y.o. male, patient, here for evaluation of the following chief complaint(s):    No chief complaint on file. Patient is seen today to evaluate his hands. He has complained of numbness, tingling and paresthesias that appear to best follow median nerve distribution more profound on the right somewhat more than the left hand. He denies any fall or precipitating injury. He has had some mild contractures of the PIP region of both middle fingers unable to fully extend. He does at times complain of pain at the basal joint region of each thumb. He has experienced some nocturnal paresthesias that awaken him from his sleep. EMG assessment by Dr. Darvin Castellon on 2022 showed very severe bilateral wrist carpal tunnel syndrome. He underwent a right open carpal tunnel release on 2022. He next underwent a left open carpal tunnel release on 2023. Vitals: There were no vitals taken for this visit. There is no height or weight on file to calculate BMI. Allergies   Allergen Reactions    Latex Rash    Adhesive Rash    Cephalexin Unknown (comments)     Unsure of reaction    Pcn [Penicillins] Rash    Zithromax [Azithromycin] Rash       Current Outpatient Medications   Medication Sig    ibuprofen (MOTRIN) 800 mg tablet Take 1 Tablet by mouth every eight (8) hours as needed for Pain. amLODIPine (NORVASC) 5 mg tablet TAKE 1 TABLET EVERY DAY    trandolapriL (MAVIK) 4 mg tablet TAKE 1 TABLET EVERY DAY    triamcinolone acetonide (KENALOG) 0.1 % ointment APPLY OINTMENT TWICE DAILY TO AFFECTED AREAS AS NEEDED FOR ITCHING    Striverdi Respimat 2.5 mcg/actuation mist INHALE 2 PUFFS EVERY DAY    potassium chloride (K-DUR, KLOR-CON M20) 20 mEq tablet TAKE 1 TABLET TWICE DAILY    hydroCHLOROthiazide (HYDRODIURIL) 25 mg tablet TAKE 1 TABLET EVERY DAY    multivitamin (ONE A DAY) tablet Take 1 Tab by mouth daily.     cholecalciferol (Vitamin D3) (1000 Units /25 mcg) tablet Take 1,000 Units by mouth daily. b complex vitamins tablet Take 1 Tab by mouth daily. sulfaSALAzine (AZULFIDINE) 500 mg tablet Take 500 mg by mouth four (4) times daily. dorzolamide (TRUSOPT) 2 % ophthalmic solution Administer 1 drop to both eyes two (2) times a day. POTASSIUM CHLORIDE PO Take  by mouth two (2) times a day. Take 20 meq qd    latanoprost (XALATAN) 0.005 % ophthalmic solution Administer 1 Drop to both eyes nightly. No current facility-administered medications for this visit.        Past Medical History:   Diagnosis Date    Arthritis     Asthma     Basal cell carcinoma of back     Cancer (HCC)     SKIN CANCERS-BASAL    Chronic pain     back    Hypertension     Other ill-defined conditions(799.89)     ulcerative cholitis    Other ill-defined conditions(799.89)     ruptured l3-4    Other ill-defined conditions(799.89)     spinal stensis    Ulcerative pancolitis without complication ([facility] [address] 5/7/3015        Past Surgical History:   Procedure Laterality Date    COLONOSCOPY N/A 6/13/2018    COLONOSCOPY performed by Arabella Krause MD at [facility] ENDOSCOPY    COLONOSCOPY N/A 2/5/2021    COLONOSCOPY   :- performed by Jair Mendoza MD at [facility] ENDOSCOPY    HX COLONOSCOPY      HX HEENT      TONSILS AS A CHILD    HX HEENT      CATARACTS    HX [address]  4/70    fussion- l45    HX ORTHOPAEDIC  2015    rods placed [facility]    OR ABDOMEN SURGERY [address]    hernia repair       Family History   Problem Relation Age of Onset    Hypertension Mother     Heart Disease Mother         CHF    Stroke Mother     Inflammatory Bowel Dz Mother     Osteoporosis Mother     Hypertension Father     Parkinsonism Father     Asthma Brother     Hypertension Brother     Psychiatric Disorder Brother         ANXIETY    Asthma Brother     Cancer Paternal Grandfather         colon    Cancer Other         stomach cancer - mother's grandfather    Anesth Problems Neg Hx         Social History     Tobacco Use    Smoking status: Never    Smokeless tobacco: Never   Substance Use Topics    Alcohol use: No    Drug use: No        Review of Systems   All other systems reviewed and are negative. Physical Exam    Both hands demonstrate middle finger PIP contractures of about 30 degrees. He does not have any palpable significant Dupuytren cords. He has some pain and crepitation with grind testing of both thumb carpometacarpal joints. He has atrophy in the thenar eminence on the right especially. His right carpal tunnel wound is well-healed without redness drainage or sign infection. Sensation seems improving. Thus far the left carpal tunnel wound is also healing well without redness drainage or sign of infection and sensation seems improved. Imaging:    No x-rays today. ASSESSMENT/PLAN:  Below is the assessment and plan developed based on review of pertinent history, physical exam, labs, studies, and medications. Patient examination was consistent with bilateral thumb CMC osteoarthritis and presumed carpal tunnel syndrome with bilateral middle finger PIP mild flexion contractures. I recommended EMG assessment for carpal tunnel. He seems to have a component of thenar atrophy on the right side and I have explained to him that if this is indeed is positive he likely would benefit from an open carpal tunnel release possibly to include a right thumb CMC arthroplasty as well. He currently wants to hold off on the thumb [facility] arthroplasty and instead proceed with a right wrist open carpal tunnel release. He underwent a right open carpal tunnel release on 8/18/2022. He may continue with simple wound care, gentle motion and strength. He is considering a left side procedure in the new year. He underwent a left open carpal tunnel release on 2/22/2023. He may continue with simple wound care, gentle motion and strength. Overall he is doing well and is pleased with the recovery.   I plan to see him back in 4 weeks for final visit.    1. Bilateral carpal tunnel syndrome  2. Primary osteoarthritis of both first carpometacarpal joints  3. Status post carpal tunnel release  4. Primary osteoarthritis of both hands  5. Bilateral hand pain      No follow-ups on file. An electronic signature was used to authenticate this note.   -- Sj Ortega MD

## 2023-03-02 ENCOUNTER — OFFICE VISIT (OUTPATIENT)
Dept: ORTHOPEDIC SURGERY | Age: 73
End: 2023-03-02
Payer: MEDICARE

## 2023-03-02 VITALS — BODY MASS INDEX: 32.58 KG/M2 | HEIGHT: 69 IN | WEIGHT: 220 LBS

## 2023-03-02 DIAGNOSIS — M79.641 BILATERAL HAND PAIN: ICD-10-CM

## 2023-03-02 DIAGNOSIS — M79.642 BILATERAL HAND PAIN: ICD-10-CM

## 2023-03-02 DIAGNOSIS — Z98.890 STATUS POST CARPAL TUNNEL RELEASE: ICD-10-CM

## 2023-03-02 DIAGNOSIS — M19.041 PRIMARY OSTEOARTHRITIS OF BOTH HANDS: ICD-10-CM

## 2023-03-02 DIAGNOSIS — G56.03 BILATERAL CARPAL TUNNEL SYNDROME: Primary | ICD-10-CM

## 2023-03-02 DIAGNOSIS — M19.042 PRIMARY OSTEOARTHRITIS OF BOTH HANDS: ICD-10-CM

## 2023-03-02 DIAGNOSIS — M18.0 PRIMARY OSTEOARTHRITIS OF BOTH FIRST CARPOMETACARPAL JOINTS: ICD-10-CM

## 2023-03-02 PROCEDURE — 99024 POSTOP FOLLOW-UP VISIT: CPT | Performed by: ORTHOPAEDIC SURGERY

## 2023-03-02 NOTE — LETTER
3/2/2023    Patient: Reno Mcclelland   YOB: 1950   Date of Visit: 3/2/2023     Rubi Stafford MD  [address] [place] [address]  [place] [place] [facility] [address]    Dear Rubi Stafford MD,      Thank you for referring Mr. Kye Yen to [facility] for evaluation. My notes for this consultation are attached. If you have questions, please do not hesitate to call me. I look forward to following your patient along with you.       Sincerely,    Jose Colby MD

## 2023-03-02 NOTE — PATIENT INSTRUCTIONS
Hand Exercises      Tendon glides   In this exercise, the steps follow one another to a make a continuous movement. With your affected hand, point your fingers and thumb straight up. Your wrist should be relaxed, following the line of your fingers and thumb. Curl your fingers so that the top two joints in them are bent, and your fingers wrap down. Your fingertips should touch or be near the base of your fingers. Your fingers will look like a hook. Make a fist by bending your knuckles. Your thumb can gently rest against your index (pointing) finger. Unwind your fingers slightly so that your fingertips can touch the base of your palm. Your thumb can rest against your index finger. Move back to your starting position, with your fingers and thumb pointing up. Repeat the series of motions 8 to 12 times. Switch hands and repeat steps 1 through 6, even if only one hand is sore. Intrinsic flexion   Rest your affected hand on a table and bend the large joints where your fingers connect to your hand. Keep your thumb and the other joints in your fingers straight. Slowly straighten your fingers. Your wrist should be relaxed, following the line of your fingers and thumb. Move back to your starting position, with your hand bent. Repeat 8 to 12 times. Switch hands and repeat steps 1 through 4, even if only one hand is sore. Finger extension   Place your affected hand flat on a table. Lift and then lower one finger at a time off the table. Repeat 8 to 12 times. Switch hands and repeat steps 1 through 3, even if only one hand is sore. MP extension   Place your good hand on a table, palm up. Put your affected hand on top of your good hand with your fingers wrapped around the thumb of your good hand like you are making a fist.  Slowly uncurl the joints of your affected hand where your fingers connect to your hand so that only the top two joints of your fingers are bent. Your fingers will look like a hook.   Move back to your starting position, with your fingers wrapped around your good thumb. Repeat 8 to 12 times. Switch hands and repeat steps 1 through 4, even if only one hand is sore. Learning About Arthritis at the [facility] of the Thumb  What is it? Arthritis at the base of the thumb joint is wear and tear on the cartilage. Cartilage is a firm, thick, slippery tissue. It covers and protects the ends of bones where they meet to form a joint. When you have arthritis, there are changes in the cartilage that cause it to break down. The bones rub together and cause joint damage and pain. What causes it? Experts don't know what causes arthritis at the base of the thumb. But aging, a lot of use, an injury, or family history may play a part. What are the symptoms? Symptoms of arthritis at the base of the thumb include aching in your joint. Or the pain may feel burning or sharp. You may feel clicking, creaking, or catching in the joint. It may get stiff. You may have more pain and less strength when you pinch or  things. Symptoms may come and go, stay the same, or get worse over time. How is it diagnosed? Your doctor can often diagnose arthritis by asking you questions about your joint pain and other symptoms and examining you. You may also have X-rays and blood tests. Blood tests can help make sure another disease isn't causing your symptoms. How is it treated? Arthritis at the base of your thumb may be treated with rest, pain relievers, steroid medicines, using a brace or splint, and--in some cases--surgery. To help relieve pain in the joint, rest your sore hand. Switch hands for some activities. You can try heat and cold therapy, such as hot compresses, paraffin wax, cold packs, or ice massage. Your doctor may give you a splint to wear during some activities or when pain flares up. You can often manage mild or moderate arthritis pain with over-the-counter pain relievers.  These include medicines that reduce swelling, such as ibuprofen or naproxen. You can also use acetaminophen. Sometimes these medicines are in creams that you can rub on your thumb and hand. Your doctor may also prescribe other medicine for your pain. For some people, steroid shots may be an option. If none of the treatments work, your doctor may discuss surgery with you. Follow-up care is a key part of your treatment and safety. Be sure to make and go to all appointments, and call your doctor if you are having problems. It's also a good idea to know your test results and keep a list of the medicines you take. Where can you learn more? Go to http://www.gray.com/  Enter T110 in the search box to learn more about \"Learning About Arthritis at the [facility] of the Thumb. \"  Current as of: March 9, 2022               Content Version: 13.4  © 8651-4892 [employer]. Care instructions adapted under license by [employer] (which disclaims liability or warranty for this information). If you have questions about a medical condition or this instruction, always ask your healthcare professional. [address] any warranty or liability for your use of this information.

## 2023-04-03 NOTE — PROGRESS NOTES
HPI: Kassandra Shukla (: 1950) is a 74 y.o. male, patient, here for evaluation of the following chief complaint(s):    No chief complaint on file. Patient is seen today to evaluate his hands. He has complained of numbness, tingling and paresthesias that appear to best follow median nerve distribution more profound on the right somewhat more than the left hand. He denies any fall or precipitating injury. He has had some mild contractures of the PIP region of both middle fingers unable to fully extend. He does at times complain of pain at the basal joint region of each thumb. He has experienced some nocturnal paresthesias that awaken him from his sleep. EMG assessment by Dr. Tricia Pollock on 2022 showed very severe bilateral wrist carpal tunnel syndrome. He underwent a right open carpal tunnel release on 2022. He next underwent a left open carpal tunnel release on 2023. Vitals: There were no vitals taken for this visit. There is no height or weight on file to calculate BMI. Allergies   Allergen Reactions    Latex Rash    Adhesive Rash    Cephalexin Unknown (comments)     Unsure of reaction    Pcn [Penicillins] Rash    Zithromax [Azithromycin] Rash       Current Outpatient Medications   Medication Sig    ibuprofen (MOTRIN) 800 mg tablet Take 1 Tablet by mouth every eight (8) hours as needed for Pain. amLODIPine (NORVASC) 5 mg tablet TAKE 1 TABLET EVERY DAY    trandolapriL (MAVIK) 4 mg tablet TAKE 1 TABLET EVERY DAY    triamcinolone acetonide (KENALOG) 0.1 % ointment APPLY OINTMENT TWICE DAILY TO AFFECTED AREAS AS NEEDED FOR ITCHING    Striverdi Respimat 2.5 mcg/actuation mist INHALE 2 PUFFS EVERY DAY    potassium chloride (K-DUR, KLOR-CON M20) 20 mEq tablet TAKE 1 TABLET TWICE DAILY    hydroCHLOROthiazide (HYDRODIURIL) 25 mg tablet TAKE 1 TABLET EVERY DAY    multivitamin (ONE A DAY) tablet Take 1 Tab by mouth daily.     cholecalciferol (Vitamin D3) (1000 Units /25 mcg) tablet Take 1,000 Units by mouth daily. b complex vitamins tablet Take 1 Tab by mouth daily. sulfaSALAzine (AZULFIDINE) 500 mg tablet Take 500 mg by mouth four (4) times daily. dorzolamide (TRUSOPT) 2 % ophthalmic solution Administer 1 drop to both eyes two (2) times a day. POTASSIUM CHLORIDE PO Take  by mouth two (2) times a day. Take 20 meq qd    latanoprost (XALATAN) 0.005 % ophthalmic solution Administer 1 Drop to both eyes nightly. No current facility-administered medications for this visit.        Past Medical History:   Diagnosis Date    Arthritis     Asthma     Basal cell carcinoma of back     Cancer ([facility] [address]     SKIN CANCERS-BASAL    Chronic pain     back    Hypertension     Other ill-defined conditions(799.89)     ulcerative cholitis    Other ill-defined conditions(799.89)     ruptured l3-4    Other ill-defined conditions(799.89)     spinal stensis    Ulcerative pancolitis without complication ([facility] [address] 2/2/7895        Past Surgical History:   Procedure Laterality Date    COLONOSCOPY N/A 6/13/2018    COLONOSCOPY performed by Yamileth Jenkins MD at [facility] ENDOSCOPY    COLONOSCOPY N/A 2/5/2021    COLONOSCOPY   :- performed by Ayanna Nelson MD at [facility] ENDOSCOPY    HX COLONOSCOPY      HX HEENT      TONSILS AS A CHILD    HX HEENT      CATARACTS    HX [address]  4/70    fussion- l45    HX ORTHOPAEDIC  2015    rods placed [facility]    [place] UNLISTED PROCEDURE ABDOMEN PERITONEUM & OMENTUM  1983    hernia repair       Family History   Problem Relation Age of Onset    Hypertension Mother     Heart Disease Mother         CHF    Stroke Mother     Inflammatory Bowel Dz Mother     Osteoporosis Mother     Hypertension Father     Parkinsonism Father     Asthma Brother     Hypertension Brother     Psychiatric Disorder Brother         ANXIETY    Asthma Brother     Cancer Paternal Grandfather         colon    Cancer Other         stomach cancer - mother's grandfather    Anesth Problems Neg Hx         Social History     Tobacco Use    Smoking status: Never    Smokeless tobacco: Never   Substance Use Topics    Alcohol use: No    Drug use: No        Review of Systems   All other systems reviewed and are negative. Physical Exam    Both hands demonstrate middle finger PIP contractures of about 30 degrees. He does not have any palpable significant Dupuytren cords. He has some pain and crepitation with grind testing of both thumb carpometacarpal joints. He has atrophy in the thenar eminence on the right especially. His right carpal tunnel wound is well-healed without redness drainage or sign infection. Sensation seems improving. Thus far the left carpal tunnel wound is also fully healed without redness drainage or sign of infection and sensation seems improved. Imaging:    No x-rays today. ASSESSMENT/PLAN:  Below is the assessment and plan developed based on review of pertinent history, physical exam, labs, studies, and medications. Patient examination was consistent with bilateral thumb CMC osteoarthritis and presumed carpal tunnel syndrome with bilateral middle finger PIP mild flexion contractures. I recommended EMG assessment for carpal tunnel. He seems to have a component of thenar atrophy on the right side and I have explained to him that if this is indeed is positive he likely would benefit from an open carpal tunnel release possibly to include a right thumb CMC arthroplasty as well. He currently wants to hold off on the thumb [facility] arthroplasty and instead proceed with a right wrist open carpal tunnel release. He underwent a right open carpal tunnel release on 8/18/2022. He may continue with simple wound care, gentle motion and strength. He is considering a left side procedure in the new year. He underwent a left open carpal tunnel release on 2/22/2023. He may continue with simple wound care, gentle motion and strength. Overall he is doing well and is pleased with the recovery.   Minor degree of scar sensitivity on the left only. He is pleased with both sides and will continue with home exercises. He may return anytime for further treatment. 1. Bilateral carpal tunnel syndrome  2. Primary osteoarthritis of both first carpometacarpal joints  3. Status post carpal tunnel release  4. Primary osteoarthritis of both hands  5. Bilateral hand pain  6. Cervical radiculopathy      No follow-ups on file. An electronic signature was used to authenticate this note.   -- Renee Nathan MD

## 2023-04-04 ENCOUNTER — OFFICE VISIT (OUTPATIENT)
Dept: ORTHOPEDIC SURGERY | Age: 73
End: 2023-04-04

## 2023-04-04 DIAGNOSIS — G56.03 BILATERAL CARPAL TUNNEL SYNDROME: Primary | ICD-10-CM

## 2023-04-04 DIAGNOSIS — M19.041 PRIMARY OSTEOARTHRITIS OF BOTH HANDS: ICD-10-CM

## 2023-04-04 DIAGNOSIS — M19.042 PRIMARY OSTEOARTHRITIS OF BOTH HANDS: ICD-10-CM

## 2023-04-04 DIAGNOSIS — M54.12 CERVICAL RADICULOPATHY: ICD-10-CM

## 2023-04-04 DIAGNOSIS — Z98.890 STATUS POST CARPAL TUNNEL RELEASE: ICD-10-CM

## 2023-04-04 DIAGNOSIS — M79.641 BILATERAL HAND PAIN: ICD-10-CM

## 2023-04-04 DIAGNOSIS — M18.0 PRIMARY OSTEOARTHRITIS OF BOTH FIRST CARPOMETACARPAL JOINTS: ICD-10-CM

## 2023-04-04 DIAGNOSIS — M79.642 BILATERAL HAND PAIN: ICD-10-CM

## 2023-04-04 NOTE — LETTER
4/4/2023    Patient: Ana Rosa Garcia   YOB: 1950   Date of Visit: 4/4/2023     Emily Galo MD  [address] [place] [address]  [place] [address]    Dear Emily Galo MD,      Thank you for referring Mr. Nelli Nick to [facility] for evaluation. My notes for this consultation are attached. If you have questions, please do not hesitate to call me. I look forward to following your patient along with you.       Sincerely,    Mart Seip, MD

## 2023-06-23 ENCOUNTER — HOSPITAL ENCOUNTER (OUTPATIENT)
Facility: HOSPITAL | Age: 73
Discharge: HOME | End: 2023-06-26

## 2023-07-31 ENCOUNTER — HOSPITAL ENCOUNTER (EMERGENCY)
Facility: HOSPITAL | Age: 73
Discharge: HOME | End: 2023-07-31
Attending: EMERGENCY MEDICINE
Payer: MEDICARE

## 2023-07-31 ENCOUNTER — APPOINTMENT (OUTPATIENT)
Facility: HOSPITAL | Age: 73
End: 2023-07-31
Payer: MEDICARE

## 2023-07-31 VITALS
RESPIRATION RATE: 18 BRPM | HEART RATE: 64 BPM | OXYGEN SATURATION: 97 % | DIASTOLIC BLOOD PRESSURE: 81 MMHG | SYSTOLIC BLOOD PRESSURE: 142 MMHG | TEMPERATURE: 97.6 F

## 2023-07-31 DIAGNOSIS — M25.561 ACUTE PAIN OF RIGHT KNEE: Primary | ICD-10-CM

## 2023-07-31 PROCEDURE — 73562 X-RAY EXAM OF KNEE 3: CPT

## 2023-07-31 PROCEDURE — 99283 EMERGENCY DEPT VISIT LOW MDM: CPT

## 2023-07-31 NOTE — ED PROVIDER NOTES
4/70    fussion- l45    ORTHOPEDIC SURGERY  2015    rods placed [facility]    [place] UNLISTED PROCEDURE ABDOMEN PERITONEUM & OMENTUM  1983    hernia repair         CURRENT MEDICATIONS       Previous Medications    AMLODIPINE (NORVASC) 5 MG TABLET    TAKE 1 TABLET EVERY DAY    DORZOLAMIDE (TRUSOPT) 2 % OPHTHALMIC SOLUTION    Apply 1 drop to eye 2 times daily    HYDROCHLOROTHIAZIDE (HYDRODIURIL) 25 MG TABLET    Take 1 tablet by mouth daily    IBUPROFEN (ADVIL;MOTRIN) 800 MG TABLET    Take 800 mg by mouth every 8 hours as needed    LATANOPROST (XALATAN) 0.005 % OPHTHALMIC SOLUTION    Apply 1 drop to eye    OLODATEROL (STRIVERDI RESPIMAT) 2.5 MCG/ACT INHALER    INHALE 2 PUFFS EVERY DAY    POTASSIUM CHLORIDE (KLOR-CON M) 20 MEQ EXTENDED RELEASE TABLET    Take 1 tablet by mouth 2 times daily    POTASSIUM CHLORIDE PO    Take by mouth 2 times daily    SULFASALAZINE (AZULFIDINE) 500 MG TABLET    Take 500 mg by mouth 4 times daily    TRANDOLAPRIL (MAVIK) 4 MG TABLET    TAKE 1 TABLET EVERY DAY    TRIAMCINOLONE (KENALOG) 0.1 % OINTMENT    APPLY OINTMENT TWICE DAILY TO AFFECTED AREAS AS NEEDED FOR ITCHING    VITAMIN D (CHOLECALCIFEROL) 25 MCG (1000 UT) TABS TABLET    Take 1,000 Units by mouth daily       ALLERGIES     Latex, Cephalexin, Adhesive tape, Azithromycin, and Penicillins    FAMILY HISTORY       Family History   Problem Relation Age of Onset    Psychiatric Disorder Brother         ANXIETY    Asthma Brother     Cancer Paternal Grandfather         colon    Cancer Other         stomach cancer - mother's grandfather    Anesth Problems Neg Hx     Parkinsonism Father     Asthma Brother     Osteoporosis Mother     Hypertension Mother     Inflam Bowel Dis Mother     Stroke Mother     Heart Disease Mother         CHF    Hypertension Brother     Hypertension Father           SOCIAL HISTORY       Social History     Socioeconomic History    Marital status:    Tobacco Use    Smoking status: Never    Smokeless tobacco: Never

## 2023-07-31 NOTE — ED TRIAGE NOTES
Pt c/o right knee pain x 2-3 days, pt getting off the toilet and heard it pop, pt ambulatory with slight limp with cane

## 2023-10-25 ENCOUNTER — HOSPITAL ENCOUNTER (EMERGENCY)
Facility: HOSPITAL | Age: 73
Discharge: HOME | End: 2023-10-25
Attending: STUDENT IN AN ORGANIZED HEALTH CARE EDUCATION/TRAINING PROGRAM
Payer: MEDICARE

## 2023-10-25 VITALS — HEART RATE: 71 BPM | TEMPERATURE: 97.9 F | RESPIRATION RATE: 16 BRPM | OXYGEN SATURATION: 95 %

## 2023-10-25 DIAGNOSIS — Z71.1 PERSON WITH FEARED HEALTH COMPLAINT IN WHOM NO DIAGNOSIS IS MADE: ICD-10-CM

## 2023-10-25 DIAGNOSIS — T14.8XXA SKIN ABRASION: Primary | ICD-10-CM

## 2023-10-25 PROCEDURE — 99282 EMERGENCY DEPT VISIT SF MDM: CPT

## 2023-10-25 ASSESSMENT — ENCOUNTER SYMPTOMS
COUGH: 0
ABDOMINAL PAIN: 0
NAUSEA: 0
SHORTNESS OF BREATH: 0
BACK PAIN: 0
VOMITING: 0

## 2023-10-25 NOTE — ED PROVIDER NOTES
Legs:    Skin:     General: Skin is warm and dry. Capillary Refill: Capillary refill takes less than 2 seconds. Neurological:      General: No focal deficit present. Mental Status: He is alert and oriented to person, place, and time. Mental status is at baseline. Psychiatric:         Mood and Affect: Mood normal.         Behavior: Behavior normal.             EMERGENCY DEPARTMENT COURSE and DIFFERENTIAL DIAGNOSIS/MDM:   Vitals: There were no vitals filed for this visit. Medical Decision Making  Presenting with superficial scratch marks to his knee. Physical exam revealing well-appearing male in no acute distress. Most likely scenario is that patient scratched his skin while sleeping and woke up this morning the marks. Tetanus up-to-date per patient. No evidence of bony abnormality or infectious etiology. Discussed symptomatic care at home. He has a primary care follow-up appointment tomorrow. Discussed my clinical impression(s), any labs and/or radiology results with the patient. I answered any questions and addressed any concerns. Discussed the importance of following up with their primary care physician and/or specialist(s). Discussed signs or symptoms that would warrant return back to the ER for further evaluation. The patient is agreeable with discharge.             REASSESSMENT          (Please note that portions of this note were completed with a voice recognition program.  Efforts were made to edit the dictations but occasionally words are mis-transcribed.)    GRETEL Hodgson NP (electronically signed)  Nurse Practitioner      GRETEL Hodgson NP  10/25/23 [phone]

## 2023-10-25 NOTE — DISCHARGE INSTRUCTIONS
Thank you for allowing us to provide you with medical care today. We realize that you have many choices for your emergency care needs. We thank you for choosing [facility]. Please choose us in the future for any continued health care needs. The exam and treatment you received in the emergency department were for an emergent problem and are not intended as complete care. It is important that you follow-up with a doctor. If your symptoms worsen or you do not improve you should return to the emergency department. We are available 24 hours a day. Please make an appointment with your health care provider for follow-up of your emergency department visit. Take this sheet with you when you go to your follow-up visit.

## 2024-01-15 ENCOUNTER — APPOINTMENT (OUTPATIENT)
Facility: HOSPITAL | Age: 74
End: 2024-01-15
Payer: MEDICARE

## 2024-01-15 ENCOUNTER — HOSPITAL ENCOUNTER (EMERGENCY)
Facility: HOSPITAL | Age: 74
Discharge: HOME | End: 2024-01-15
Attending: EMERGENCY MEDICINE
Payer: MEDICARE

## 2024-01-15 VITALS
SYSTOLIC BLOOD PRESSURE: 142 MMHG | DIASTOLIC BLOOD PRESSURE: 91 MMHG | TEMPERATURE: 97.7 F | WEIGHT: 240.08 LBS | HEART RATE: 86 BPM | HEIGHT: 69 IN | RESPIRATION RATE: 16 BRPM | BODY MASS INDEX: 35.56 KG/M2 | OXYGEN SATURATION: 95 %
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
## 2024-01-15 DIAGNOSIS — S80.211A ABRASION, RIGHT KNEE, INITIAL ENCOUNTER: ICD-10-CM

## 2024-01-15 DIAGNOSIS — I10 HYPERTENSION, UNSPECIFIED TYPE: Primary | ICD-10-CM

## 2024-01-15 LAB
ANION GAP SERPL CALC-SCNC: 14 MMOL/L (ref 5–15)
BUN SERPL-MCNC: 19 MG/DL (ref 6–20)
BUN/CREAT SERPL: 20 (ref 12–20)
CALCIUM SERPL-MCNC: 9.7 MG/DL (ref 8.5–10.1)
CHLORIDE SERPL-SCNC: 97 MMOL/L (ref 97–108)
CO2 SERPL-SCNC: 24 MMOL/L (ref 21–32)
CREAT SERPL-MCNC: 0.94 MG/DL (ref 0.7–1.3)
GLUCOSE SERPL-MCNC: 153 MG/DL (ref 65–100)
POTASSIUM SERPL-SCNC: 3.5 MMOL/L (ref 3.5–5.1)
SODIUM SERPL-SCNC: 135 MMOL/L (ref 136–145)

## 2024-01-15 PROCEDURE — 80048 BASIC METABOLIC PNL TOTAL CA: CPT

## 2024-01-15 PROCEDURE — 36415 COLL VENOUS BLD VENIPUNCTURE: CPT

## 2024-01-15 PROCEDURE — 99284 EMERGENCY DEPT VISIT MOD MDM: CPT

## 2024-01-15 PROCEDURE — 73562 X-RAY EXAM OF KNEE 3: CPT

## 2024-01-15 ASSESSMENT — PAIN SCALES - GENERAL: PAINLEVEL_OUTOF10: 3

## 2024-01-15 ASSESSMENT — PAIN DESCRIPTION - LOCATION: LOCATION: KNEE

## 2024-01-15 ASSESSMENT — PAIN DESCRIPTION - ORIENTATION: ORIENTATION: RIGHT

## 2024-01-15 ASSESSMENT — PAIN DESCRIPTION - DESCRIPTORS: DESCRIPTORS: SORE

## 2024-01-15 NOTE — ED PROVIDER NOTES
Week: 2 days     Minutes of Exercise per Session: 20 min         PHYSICAL EXAM       ED Triage Vitals [01/15/24 1213]   BP Temp Temp Source Pulse Respirations SpO2 Height Weight - Scale   (!) 164/82 97.7 °F (36.5 °C) Oral 86 16 97 % 1.753 m (5' 9\") 108.9 kg (240 lb 1.3 oz)       Body mass index is 35.45 kg/m².    Physical Exam  Vitals and nursing note reviewed.   Constitutional:       Appearance: Normal appearance.   Pulmonary:      Effort: No respiratory distress.   Musculoskeletal:      Comments: Small abrasion just inferior to the right knee.  Extensor mechanism is intact.  Joint is stable.   Neurological:      General: No focal deficit present.      Mental Status: He is alert and oriented to person, place, and time.             EMERGENCY DEPARTMENT COURSE and DIFFERENTIAL DIAGNOSIS/MDM:   Vitals:    Vitals:    01/15/24 1213   BP: (!) 164/82   Pulse: 86   Resp: 16   Temp: 97.7 °F (36.5 °C)   TempSrc: Oral   SpO2: 97%   Weight: 108.9 kg (240 lb 1.3 oz)   Height: 1.753 m (5' 9\")         Medical Decision Making  73-year-old male presents to the emergency department as above with concern for elevated blood pressure after a fall today with right knee pain.  He has reassuring examination with an abrasion.  Otherwise no concerning findings on examination.  Laboratory workup is not consistent with hypertensive emergency.  He has no evidence of hypertensive emergency, occult bony injury, or other concerning features.  Will recommend local wound care for the knee.  Follow-up with primary care for high blood pressure    Amount and/or Complexity of Data Reviewed  Labs: ordered.  Radiology: ordered and independent interpretation performed. Decision-making details documented in ED Course.            REASSESSMENT     ED Course as of 01/15/24 1327   Mon Jasson 15, 2024   1245 I have independently viewed the obtained radiographic images and note right knee x-ray without acute process. Will await radiology read. [JM]   1245 Patient

## 2024-01-15 NOTE — ED TRIAGE NOTES
ED triage note: Per [facility] patient fell while cleaning his yard. States tripped on a tarp around 0900 and fell on right knee. States took an advil. Denies hitting head. Denies blood thinners. States blood pressure has also been high.

## 2024-01-15 NOTE — ED NOTES

## 2024-05-02 ENCOUNTER — HOSPITAL ENCOUNTER (EMERGENCY)
Facility: HOSPITAL | Age: 74
Discharge: HOME | End: 2024-05-02
Attending: STUDENT IN AN ORGANIZED HEALTH CARE EDUCATION/TRAINING PROGRAM
Payer: MEDICARE

## 2024-05-02 ENCOUNTER — APPOINTMENT (OUTPATIENT)
Facility: HOSPITAL | Age: 74
End: 2024-05-02
Payer: MEDICARE

## 2024-05-02 VITALS
RESPIRATION RATE: 20 BRPM | HEART RATE: 92 BPM | HEIGHT: 69 IN | DIASTOLIC BLOOD PRESSURE: 98 MMHG | OXYGEN SATURATION: 95 % | WEIGHT: 233.25 LBS | BODY MASS INDEX: 34.55 KG/M2 | SYSTOLIC BLOOD PRESSURE: 181 MMHG | TEMPERATURE: 97.5 F

## 2024-05-02 DIAGNOSIS — J06.9 UPPER RESPIRATORY TRACT INFECTION, UNSPECIFIED TYPE: ICD-10-CM

## 2024-05-02 DIAGNOSIS — T14.8XXA HEMATOMA: Primary | ICD-10-CM

## 2024-05-02 PROCEDURE — 99283 EMERGENCY DEPT VISIT LOW MDM: CPT

## 2024-05-02 PROCEDURE — 71046 X-RAY EXAM CHEST 2 VIEWS: CPT

## 2024-05-02 ASSESSMENT — ENCOUNTER SYMPTOMS
NAUSEA: 0
CONSTIPATION: 0
VOMITING: 0
BACK PAIN: 0
TROUBLE SWALLOWING: 0
SHORTNESS OF BREATH: 0
COUGH: 0
COLOR CHANGE: 1
DIARRHEA: 0
ABDOMINAL PAIN: 0

## 2024-05-02 ASSESSMENT — PAIN DESCRIPTION - DESCRIPTORS: DESCRIPTORS: ACHING

## 2024-05-02 ASSESSMENT — PAIN DESCRIPTION - PAIN TYPE: TYPE: ACUTE PAIN

## 2024-05-02 ASSESSMENT — PAIN DESCRIPTION - LOCATION: LOCATION: ARM

## 2024-05-02 ASSESSMENT — PAIN SCALES - GENERAL: PAINLEVEL_OUTOF10: 6

## 2024-05-02 ASSESSMENT — PAIN DESCRIPTION - ORIENTATION: ORIENTATION: RIGHT;INNER;UPPER

## 2024-05-02 ASSESSMENT — PAIN - FUNCTIONAL ASSESSMENT
PAIN_FUNCTIONAL_ASSESSMENT: ACTIVITIES ARE NOT PREVENTED
PAIN_FUNCTIONAL_ASSESSMENT: 0-10

## 2024-05-02 ASSESSMENT — PAIN DESCRIPTION - FREQUENCY: FREQUENCY: CONTINUOUS

## 2024-05-02 NOTE — ED PROVIDER NOTES
[facility] EMERGENCY DEP  EMERGENCY DEPARTMENT ENCOUNTER      Pt Name: Gorge Holcomb  MRN: [id number]  Birthdate 1950  Date of evaluation: 5/2/2024  Provider: GRETEL Ramirez NP    CHIEF COMPLAINT       Chief Complaint   Patient presents with    Arm Pain         HISTORY OF PRESENT ILLNESS   (Location/Symptom, Timing/Onset, Context/Setting, Quality, Duration, Modifying Factors, Severity)  Note limiting factors.   HPI patient is a 74-year-old male with past medical history significant for ulcerative pancolitis, type 2 diabetes, hypercholesterolemia, spinal stenosis, hypertension, diabetic retinopathy, basal cell carcinoma and arthritis who presents to the ED with a large localized bruise on the right upper bicep area.  He also reports congested cough that has lingered for couple of weeks.Denies fever, cold symptoms,headache,  neck pain, visual changes, focal weakness or rash. Denies any difficulty breathing, difficulty swallowing, SOB or chest pain. Denies any nausea, vomiting or diarrhea. Pt. Reports that he has not any pain or multisymptom cold medications today prior to arrival.    Old charts reviewed.      Review of External Medical Records:     Nursing Notes were reviewed.    REVIEW OF SYSTEMS    (2-9 systems for level 4, 10 or more for level 5)     Review of Systems   Constitutional:  Negative for activity change, appetite change, fever and unexpected weight change.   HENT:  Negative for congestion and trouble swallowing.    Eyes:  Negative for visual disturbance.   Respiratory:  Negative for cough and shortness of breath.    Cardiovascular:  Negative for chest pain, palpitations and leg swelling.   Gastrointestinal:  Negative for abdominal pain, constipation, diarrhea, nausea and vomiting.   Genitourinary:  Negative for flank pain.   Musculoskeletal:  Negative for back pain and neck pain.   Skin:  Positive for color change. Negative for rash and wound.   Neurological:  Negative for headaches.   All

## 2024-05-02 NOTE — ED TRIAGE NOTES
Pt comes to ED from home with reports of right upper, inner arm pain. States yesterday he was providing self hygiene after having a BM and reports hurting himself. Pt noted to have a red-purple-scooter discoloration to his right inner arm. +ROM.     SHAMEKA Meek assessing pt in triage.

## 2024-05-23 PROBLEM — D69.2 SENILE PURPURA (HCC): Status: ACTIVE | Noted: 2024-05-23

## 2024-05-29 DIAGNOSIS — C61 MALIGNANT NEOPLASM OF PROSTATE (HCC): Primary | ICD-10-CM

## 2024-06-04 DIAGNOSIS — C61 MALIGNANT NEOPLASM OF PROSTATE (HCC): ICD-10-CM

## 2024-06-07 LAB — PSA SERPL DL<=0.01 NG/ML-MCNC: 0.21 NG/ML (ref 0–4)

## 2024-06-10 ENCOUNTER — HOSPITAL ENCOUNTER (OUTPATIENT)
Facility: HOSPITAL | Age: 74
Discharge: HOME | End: 2024-06-13

## 2024-06-10 VITALS
HEART RATE: 74 BPM | WEIGHT: 220 LBS | HEIGHT: 70 IN | SYSTOLIC BLOOD PRESSURE: 142 MMHG | BODY MASS INDEX: 31.5 KG/M2 | DIASTOLIC BLOOD PRESSURE: 86 MMHG

## 2024-06-10 DIAGNOSIS — C61 PROSTATE CANCER (HCC): Primary | ICD-10-CM

## 2024-06-10 RX ORDER — ASPIRIN 81 MG/1
81 TABLET, CHEWABLE ORAL DAILY
COMMUNITY

## 2024-06-10 ASSESSMENT — PAIN SCALES - GENERAL: PAINLEVEL_OUTOF10: 0

## 2024-06-10 NOTE — CONSULTS
(HCC)     Ulcerative pancolitis without complication (HCC) 5/8/2018         PAST SURGICAL HISTORY:  Past Surgical History:   Procedure Laterality Date    COLONOSCOPY N/A 6/13/2018    COLONOSCOPY performed by William Smith MD at [facility] ENDOSCOPY    COLONOSCOPY N/A 2/5/2021    COLONOSCOPY   :- performed by Collin Francis MD at [facility] ENDOSCOPY    COLONOSCOPY      HEENT      TONSILS AS A CHILD    HEENT      CATARACTS    ORTHOPEDIC SURGERY  4/70    fussion- l45    ORTHOPEDIC SURGERY  2015    rods placed [place] [facility]    [place] UNLISTED PROCEDURE ABDOMEN PERITONEUM & OMENTUM  1983    hernia repair         FAMILY HISTORY:   Family History   Problem Relation Age of Onset    Psychiatric Disorder Brother         ANXIETY    Asthma Brother     Cancer Paternal Grandfather         colon    Cancer Other         stomach cancer - mother's grandfather    Anesth Problems Neg Hx     Parkinsonism Father     Asthma Brother     Osteoporosis Mother     Hypertension Mother     Inflam Bowel Dis Mother     Stroke Mother     Heart Disease Mother         CHF    Hypertension Brother     Hypertension Father        SOCIAL HISTORY:   Social History     Occupational History    Not on file   Tobacco Use    Smoking status: Never    Smokeless tobacco: Never   Substance and Sexual Activity    Alcohol use: No    Drug use: No    Sexual activity: Not on file       ALLERGIES/MEDICATIONS:    Allergies   Allergen Reactions    Latex Rash    Cephalexin      Other reaction(s): Unknown (comments)  Unsure of reaction    Adhesive Tape Rash    Azithromycin Rash    Penicillins Rash     Current Outpatient Medications   Medication Sig Dispense Refill    aspirin 81 MG chewable tablet Take 1 tablet by mouth daily      trandolapril (MAVIK) 4 MG tablet TAKE 1 TABLET EVERY DAY 90 tablet 3    potassium chloride (KLOR-CON M) 20 MEQ extended release tablet TAKE 1 TABLET TWICE DAILY 180 tablet 3    amLODIPine (NORVASC) 5 MG tablet TAKE 1 TABLET EVERY DAY 90 tablet 3    folic acid

## 2025-03-12 ENCOUNTER — HOSPITAL ENCOUNTER (OUTPATIENT)
Facility: HOSPITAL | Age: 75
Setting detail: OUTPATIENT SURGERY
Discharge: HOME | End: 2025-03-12
Attending: INTERNAL MEDICINE | Admitting: INTERNAL MEDICINE
Payer: MEDICARE

## 2025-03-12 ENCOUNTER — ANESTHESIA EVENT (OUTPATIENT)
Facility: HOSPITAL | Age: 75
End: 2025-03-12
Payer: MEDICARE

## 2025-03-12 ENCOUNTER — ANESTHESIA (OUTPATIENT)
Facility: HOSPITAL | Age: 75
End: 2025-03-12
Payer: MEDICARE

## 2025-03-12 VITALS
TEMPERATURE: 98 F | BODY MASS INDEX: 30.13 KG/M2 | OXYGEN SATURATION: 99 % | HEART RATE: 69 BPM | DIASTOLIC BLOOD PRESSURE: 84 MMHG | WEIGHT: 210 LBS | SYSTOLIC BLOOD PRESSURE: 127 MMHG | RESPIRATION RATE: 16 BRPM

## 2025-03-12 PROCEDURE — 3600007502: Performed by: INTERNAL MEDICINE

## 2025-03-12 PROCEDURE — 3600007512: Performed by: INTERNAL MEDICINE

## 2025-03-12 PROCEDURE — 7100000010 HC PHASE II RECOVERY - FIRST 15 MIN: Performed by: INTERNAL MEDICINE

## 2025-03-12 PROCEDURE — 3700000001 HC ADD 15 MINUTES (ANESTHESIA): Performed by: INTERNAL MEDICINE

## 2025-03-12 PROCEDURE — 2580000003 HC RX 258: Performed by: INTERNAL MEDICINE

## 2025-03-12 PROCEDURE — 7100000011 HC PHASE II RECOVERY - ADDTL 15 MIN: Performed by: INTERNAL MEDICINE

## 2025-03-12 PROCEDURE — 6360000002 HC RX W HCPCS

## 2025-03-12 PROCEDURE — 2709999900 HC NON-CHARGEABLE SUPPLY: Performed by: INTERNAL MEDICINE

## 2025-03-12 PROCEDURE — 88305 TISSUE EXAM BY PATHOLOGIST: CPT

## 2025-03-12 PROCEDURE — 3700000000 HC ANESTHESIA ATTENDED CARE: Performed by: INTERNAL MEDICINE

## 2025-03-12 RX ORDER — SODIUM CHLORIDE 0.9 % (FLUSH) 0.9 %
5-40 SYRINGE (ML) INJECTION EVERY 12 HOURS SCHEDULED
Status: DISCONTINUED | OUTPATIENT
Start: 2025-03-12 | End: 2025-03-12 | Stop reason: HOSPADM

## 2025-03-12 RX ORDER — SODIUM CHLORIDE 9 MG/ML
INJECTION, SOLUTION INTRAVENOUS PRN
Status: DISCONTINUED | OUTPATIENT
Start: 2025-03-12 | End: 2025-03-12 | Stop reason: HOSPADM

## 2025-03-12 RX ORDER — SODIUM CHLORIDE 0.9 % (FLUSH) 0.9 %
5-40 SYRINGE (ML) INJECTION PRN
Status: DISCONTINUED | OUTPATIENT
Start: 2025-03-12 | End: 2025-03-12 | Stop reason: HOSPADM

## 2025-03-12 RX ORDER — SODIUM CHLORIDE 9 MG/ML
INJECTION, SOLUTION INTRAVENOUS CONTINUOUS
Status: DISCONTINUED | OUTPATIENT
Start: 2025-03-12 | End: 2025-03-12 | Stop reason: HOSPADM

## 2025-03-12 RX ADMIN — PROPOFOL 50 MG: 10 INJECTION, EMULSION INTRAVENOUS at 14:37

## 2025-03-12 RX ADMIN — PROPOFOL 50 MG: 10 INJECTION, EMULSION INTRAVENOUS at 14:42

## 2025-03-12 RX ADMIN — SODIUM CHLORIDE: 9 INJECTION, SOLUTION INTRAVENOUS at 14:31

## 2025-03-12 RX ADMIN — PROPOFOL 50 MG: 10 INJECTION, EMULSION INTRAVENOUS at 14:56

## 2025-03-12 RX ADMIN — PROPOFOL 50 MG: 10 INJECTION, EMULSION INTRAVENOUS at 14:46

## 2025-03-12 RX ADMIN — PROPOFOL 50 MG: 10 INJECTION, EMULSION INTRAVENOUS at 14:40

## 2025-03-12 RX ADMIN — PROPOFOL 50 MG: 10 INJECTION, EMULSION INTRAVENOUS at 14:51

## 2025-03-12 NOTE — H&P
[facility] [address] [address] [phone]        History and Physical     NAME:  Gorge Holcomb   :  1950   MRN:  [id number]         HPI:  Gorge Holcomb is a 74 y.o. male here for colonoscopy. Patient has h/o ulcerative pan-colitis in clinical remission on sulfasalazine. However, noted to have elevated fecal calprotectin. Last colonoscopy  with mild colitis.    Past Surgical History:   Procedure Laterality Date    COLONOSCOPY N/A 2018    COLONOSCOPY performed by William Smith MD at [facility] ENDOSCOPY    COLONOSCOPY N/A 2021    COLONOSCOPY   :- performed by Collin Francis MD at [facility] ENDOSCOPY    COLONOSCOPY      HEENT      TONSILS AS A CHILD    HEENT      CATARACTS    ORTHOPEDIC SURGERY      fussion- l45    ORTHOPEDIC SURGERY      rods placed [place] [facility]    [place] UNLISTED PROCEDURE ABDOMEN PERITONEUM & OMENTUM  1983    hernia repair     Past Medical History:   Diagnosis Date    Arthritis     Asthma     Basal cell carcinoma of back     Cancer (HCC)     SKIN CANCERS-BASAL    Chronic pain     back    Hypertension     Other ill-defined conditions(799.89)     spinal stensis    Other ill-defined conditions(799.89)     ruptured l3-4    Other ill-defined conditions(799.89)     ulcerative cholitis    Prostate cancer (HCC)     Ulcerative pancolitis without complication (HCC) 2018     Social History     Tobacco Use    Smoking status: Never    Smokeless tobacco: Never   Substance Use Topics    Alcohol use: No    Drug use: No     No current facility-administered medications on file prior to encounter.     Current Outpatient Medications on File Prior to Encounter   Medication Sig Dispense Refill    amLODIPine (NORVASC) 5 MG tablet TAKE 1 TABLET EVERY DAY 90 tablet 3    olodaterol (STRIVERDI RESPIMAT) 2.5 MCG/ACT inhaler INHALE 2 PUFFS EVERY DAY 12 g 3    hydroCHLOROthiazide (HYDRODIURIL) 25 MG tablet TAKE 1 TABLET EVERY DAY 90 tablet 3    aspirin

## 2025-03-12 NOTE — DISCHARGE INSTRUCTIONS
[facility] [address] [address]          Gorge Holcomb  [id number]  1950    COLON DISCHARGE INSTRUCTIONS    DISCOMFORT:  Redness at IV site- apply warm compress to area; if redness or soreness persist- contact your physician  There may be a slight amount of blood passed from the rectum  Gaseous discomfort- walking, belching will help relieve any discomfort    DIET:   High fiber diet.     ACTIVITY:  You may resume your normal daily activities it is recommended that you spend the remainder of the day resting -  avoid any strenuous activity.  You may not operate a vehicle for 12 hours  You may not engage in an occupation involving machinery or appliances for rest of today  You may not drink alcoholic beverages for at least 12 hours  Avoid making any critical decisions for at least 24 hour    CALL M.D.  ANY SIGN OF:   Increasing pain, nausea, vomiting  Abdominal distension (swelling)  New increased bleeding (oral or rectal)  Fever (chills)  Pain in chest area  Bloody discharge from nose or mouth  Shortness of breath     Follow-up Instructions:   Call Dr. Collin Francis for any questions or problems.   Telephone # [phone]  Biopsy results will be available in  5 to 7 days    Biopsies obtained with cold forceps      Collin Francis MD    ***

## 2025-03-12 NOTE — PERIOP NOTE
Initial RN admission and assessment performed and documented in Endoscopy navigator.     Patient evaluated by anesthesia in pre-procedure holding.     All procedural vital signs, airway assessment, and level of consciousness information monitored and recorded by anesthesia staff on the anesthesia record.     Report received from CRNA post procedure.  Patient transported to recovery area by RN.    Endoscopy post procedure time out was performed and specimens were verified with physician.    Endoscope was pre-cleaned at bedside immediately following procedure by BISI Nava

## 2025-03-12 NOTE — OP NOTE
[address] [address] [phone]               Colonoscopy Operative Report      Indications:  Ulcerative pan-colitis, in clinical remission. Last colonoscopy 2021 with mild colitis. Elevated calprotectin.    :  Collin Francis MD    Staff: Circulator: Renetta Desai RN  Endoscopy Technician: Shaheen Landers     Referring Provider: Ilir Gonzalez MD    Sedation:  MAC anesthesia    Procedure Details:  After informed consent was obtained with all risks and benefits of procedure explained and preoperative exam completed, the patient was taken to the endoscopy suite and placed in the left lateral decubitus position.  Upon sequential sedation as per above, a digital rectal exam was performed  And was normal.  The Olympus videocolonoscope  was inserted in the rectum and carefully advanced to the cecum and terminal ileum. The cecum was identified by the ileocecal valve and appendiceal orifice.  The quality of preparation was good.  The colonoscope was slowly withdrawn with careful evaluation between folds. Retroflexion in the rectum was performed and was normal..     Findings:   Rectum: Medium sized internal hemorrhoids.   Sigmoid: normal  Descending Colon: normal  Transverse Colon: normal  Ascending Colon: normal  Cecum: normal  Terminal Ileum: normal    Interventions:  none    Specimen Removed:   ID Type Source Tests Collected by Time Destination   1 : Right  colon biopsy Tissue Colon SURGICAL PATHOLOGY Collin Francis MD 3/12/2025 1451    2 : Left colon biopsy Tissue Colon SURGICAL PATHOLOGY Collin Francis MD 3/12/2025 1452    3 : rectum colon biopsy Tissue Rectum SURGICAL PATHOLOGY Collin Francis MD 3/12/2025 1452        EBL:  Minimal    Complications:  None; patient tolerated the procedure well.    Impression:  -Normal colon and terminal ileum. No obvious inflammatory changes or polyps.   -Medium sized internal hemorrhoids.     Recommendations:

## 2025-03-13 PROBLEM — G95.9 DISORDER OF SPINAL CORD (HCC): Status: ACTIVE | Noted: 2025-03-13

## 2025-03-14 NOTE — ANESTHESIA POSTPROCEDURE EVALUATION
Department of Anesthesiology  Postprocedure Note    Patient: Gorge Holcomb  MRN: [id number]  YOB: 1950  Date of evaluation: 3/14/2025    Procedure Summary       Date: 03/12/25 Room / Location: [facility] ENDO 04 / [facility] ENDOSCOPY    Anesthesia Start: 1431 Anesthesia Stop: 1505    Procedure: COLONOSCOPY (Lower GI Region) Diagnosis:       Ulcerative pancolitis (HCC)      (Ulcerative pancolitis (HCC) [K51.00])    Surgeons: Collin Francis MD Responsible Provider: Donnell Malloy MD    Anesthesia Type: MAC ASA Status: 2            Anesthesia Type: MAC    Bernarda Phase I: Bernarda Score: 10    Bernarda Phase II: Bernarda Score: 10    Anesthesia Post Evaluation    Patient location during evaluation: bedside  Cardiovascular status: blood pressure returned to baseline  Respiratory status: acceptable  Hydration status: euvolemic    No notable events documented.

## 2025-03-14 NOTE — ANESTHESIA PRE PROCEDURE
Department of Anesthesiology  Preprocedure Note       Name:  Gorge Holcomb   Age:  74 y.o.  :  1950                                          MRN:  [id number]         Date:  3/14/2025      Surgeon: Surgeon(s):  Collin Francis MD    Procedure: Procedure(s):  COLONOSCOPY    Medications prior to admission:   Prior to Admission medications    Medication Sig Start Date End Date Taking? Authorizing Provider   potassium chloride (KLOR-CON M) 20 MEQ extended release tablet TAKE 1 TABLET TWICE DAILY 25  Yes Ilir Gonzalez MD   amLODIPine (NORVASC) 5 MG tablet TAKE 1 TABLET EVERY DAY 10/14/24  Yes Ilir Gonzalez MD   olodaterol (STRIVERDI RESPIMAT) 2.5 MCG/ACT inhaler INHALE 2 PUFFS EVERY DAY 24  Yes Ilir Gonzalez MD   hydroCHLOROthiazide (HYDRODIURIL) 25 MG tablet TAKE 1 TABLET EVERY DAY 24  Yes Ilir Gonzalez MD   aspirin 81 MG chewable tablet Take 1 tablet by mouth daily   Yes ProviderYari MD   trandolapril (MAVIK) 4 MG tablet TAKE 1 TABLET EVERY DAY 5/15/24  Yes Ilir Gonzalez MD   folic acid (FOLVITE) 1 MG tablet  7/10/23  Yes ProviderYari MD   POTASSIUM CHLORIDE PO Take by mouth 2 times daily   Yes Automatic Reconciliation, Ar   vitamin D (CHOLECALCIFEROL) 25 MCG (1000 UT) TABS tablet Take 1 tablet by mouth daily   Yes Automatic Reconciliation, Ar   dorzolamide (TRUSOPT) 2 % ophthalmic solution Apply 1 drop to eye 2 times daily   Yes Automatic Reconciliation, Ar   ibuprofen (ADVIL;MOTRIN) 800 MG tablet Take 1 tablet by mouth every 8 hours as needed 23  Yes Automatic Reconciliation, Ar   latanoprost (XALATAN) 0.005 % ophthalmic solution Apply 1 drop to eye   Yes Automatic Reconciliation, Ar   sulfaSALAzine (AZULFIDINE) 500 MG tablet Take 1 tablet by mouth 4 times daily   Yes Automatic Reconciliation, Ar   triamcinolone (KENALOG) 0.1 % ointment APPLY OINTMENT TWICE DAILY TO AFFECTED AREAS AS NEEDED FOR ITCHING 22  Yes Automatic Reconciliation, Ar       Current

## 2025-03-17 ENCOUNTER — TRANSCRIBE ORDERS (OUTPATIENT)
Facility: HOSPITAL | Age: 75
End: 2025-03-17

## 2025-03-17 DIAGNOSIS — R09.89 DECREASED PEDAL PULSES: Primary | ICD-10-CM

## 2025-03-24 ENCOUNTER — HOSPITAL ENCOUNTER (OUTPATIENT)
Facility: HOSPITAL | Age: 75
Discharge: HOME | End: 2025-03-26
Attending: FAMILY MEDICINE
Payer: MEDICARE

## 2025-03-24 DIAGNOSIS — R09.89 DECREASED PEDAL PULSES: ICD-10-CM

## 2025-03-24 PROCEDURE — 93925 LOWER EXTREMITY STUDY: CPT

## 2025-03-25 LAB
VAS LEFT ATA DIST PSV: 83.4 CM/S
VAS LEFT ATA PROX PSV: 48.8 CM/S
VAS LEFT CFA PROX PSV: 105.8 CM/S
VAS LEFT PERONEAL DIST PSV: 49 CM/S
VAS LEFT PFA PROX PSV: 49.4 CM/S
VAS LEFT POP A DIST PSV: 80.1 CM/S
VAS LEFT POP A PROX PSV: 55.6 CM/S
VAS LEFT POP A PROX VEL RATIO: 0.83
VAS LEFT PTA DIST PSV: 62.2 CM/S
VAS LEFT PTA PROX PSV: 76.8 CM/S
VAS LEFT SFA DIST PSV: 66.9 CM/S
VAS LEFT SFA DIST VEL RATIO: 0.91
VAS LEFT SFA MID PSV: 73.5 CM/S
VAS LEFT SFA MID VEL RATIO: 0.65
VAS LEFT SFA PROX PSV: 113 CM/S
VAS LEFT SFA PROX VEL RATIO: 1.07
VAS RIGHT ATA DIST PSV: 88.3 CM/S
VAS RIGHT ATA PROX PSV: 53.9 CM/S
VAS RIGHT CFA PROX PSV: 117.4 CM/S
VAS RIGHT PERONEAL DIST PSV: 69.8 CM/S
VAS RIGHT PFA PROX PSV: 101 CM/S
VAS RIGHT POP A DIST PSV: 72.6 CM/S
VAS RIGHT POP A PROX PSV: 64.1 CM/S
VAS RIGHT POP A PROX VEL RATIO: 0.98
VAS RIGHT PTA DIST PSV: 67.9 CM/S
VAS RIGHT PTA PROX PSV: 75.5 CM/S
VAS RIGHT SFA DIST PSV: 65.4 CM/S
VAS RIGHT SFA DIST VEL RATIO: 0.74
VAS RIGHT SFA MID PSV: 88.7 CM/S
VAS RIGHT SFA MID VEL RATIO: 0.6
VAS RIGHT SFA PROX PSV: 139.2 CM/S
VAS RIGHT SFA PROX VEL RATIO: 1.2

## 2025-05-11 ENCOUNTER — HOSPITAL ENCOUNTER (EMERGENCY)
Facility: HOSPITAL | Age: 75
Discharge: HOME | End: 2025-05-11
Attending: STUDENT IN AN ORGANIZED HEALTH CARE EDUCATION/TRAINING PROGRAM
Payer: MEDICARE

## 2025-05-11 ENCOUNTER — APPOINTMENT (OUTPATIENT)
Facility: HOSPITAL | Age: 75
End: 2025-05-11
Payer: MEDICARE

## 2025-05-11 VITALS
DIASTOLIC BLOOD PRESSURE: 77 MMHG | WEIGHT: 209.44 LBS | RESPIRATION RATE: 16 BRPM | HEIGHT: 70 IN | SYSTOLIC BLOOD PRESSURE: 133 MMHG | OXYGEN SATURATION: 97 % | HEART RATE: 56 BPM | TEMPERATURE: 97.5 F | BODY MASS INDEX: 29.98 KG/M2

## 2025-05-11 DIAGNOSIS — Z98.1 H/O SPINAL FUSION: ICD-10-CM

## 2025-05-11 DIAGNOSIS — G89.29 ACUTE ON CHRONIC BACK PAIN: Primary | ICD-10-CM

## 2025-05-11 DIAGNOSIS — M54.9 ACUTE ON CHRONIC BACK PAIN: Primary | ICD-10-CM

## 2025-05-11 LAB
APPEARANCE UR: CLEAR
BACTERIA URNS QL MICRO: NEGATIVE /HPF
BILIRUB UR QL: NEGATIVE
COLOR UR: ABNORMAL
EPITH CASTS URNS QL MICRO: ABNORMAL /LPF
GLUCOSE UR STRIP.AUTO-MCNC: NEGATIVE MG/DL
HGB UR QL STRIP: NEGATIVE
HYALINE CASTS URNS QL MICRO: ABNORMAL /LPF (ref 0–5)
KETONES UR QL STRIP.AUTO: 15 MG/DL
LEUKOCYTE ESTERASE UR QL STRIP.AUTO: NEGATIVE
NITRITE UR QL STRIP.AUTO: NEGATIVE
PH UR STRIP: 5 (ref 5–8)
PROT UR STRIP-MCNC: NEGATIVE MG/DL
RBC #/AREA URNS HPF: ABNORMAL /HPF (ref 0–5)
SP GR UR REFRACTOMETRY: 1.01 (ref 1–1.03)
SPECIMEN HOLD: NORMAL
UROBILINOGEN UR QL STRIP.AUTO: 0.2 EU/DL (ref 0.2–1)
WBC URNS QL MICRO: ABNORMAL /HPF (ref 0–4)

## 2025-05-11 PROCEDURE — 72131 CT LUMBAR SPINE W/O DYE: CPT

## 2025-05-11 PROCEDURE — 99284 EMERGENCY DEPT VISIT MOD MDM: CPT

## 2025-05-11 PROCEDURE — 6370000000 HC RX 637 (ALT 250 FOR IP): Performed by: PHYSICIAN ASSISTANT

## 2025-05-11 PROCEDURE — 81001 URINALYSIS AUTO W/SCOPE: CPT

## 2025-05-11 RX ORDER — METHOCARBAMOL 500 MG/1
1000 TABLET, FILM COATED ORAL 3 TIMES DAILY PRN
Qty: 18 TABLET | Refills: 0 | Status: SHIPPED | OUTPATIENT
Start: 2025-05-11

## 2025-05-11 RX ORDER — LIDOCAINE 50 MG/G
1 PATCH TOPICAL DAILY
Qty: 10 PATCH | Refills: 0 | Status: SHIPPED | OUTPATIENT
Start: 2025-05-11 | End: 2025-05-11

## 2025-05-11 RX ORDER — NAPROXEN 250 MG/1
500 TABLET ORAL
Status: COMPLETED | OUTPATIENT
Start: 2025-05-11 | End: 2025-05-11

## 2025-05-11 RX ORDER — ACETAMINOPHEN 500 MG
1000 TABLET ORAL 3 TIMES DAILY PRN
Qty: 180 TABLET | Refills: 0 | Status: SHIPPED | OUTPATIENT
Start: 2025-05-11 | End: 2025-05-11

## 2025-05-11 RX ORDER — NAPROXEN 500 MG/1
500 TABLET ORAL 2 TIMES DAILY WITH MEALS
Qty: 14 TABLET | Refills: 0 | Status: SHIPPED | OUTPATIENT
Start: 2025-05-11 | End: 2025-05-11

## 2025-05-11 RX ORDER — LIDOCAINE 4 G/G
1 PATCH TOPICAL
Status: DISCONTINUED | OUTPATIENT
Start: 2025-05-11 | End: 2025-05-11 | Stop reason: HOSPADM

## 2025-05-11 RX ORDER — IBUPROFEN 600 MG/1
600 TABLET, FILM COATED ORAL EVERY 6 HOURS PRN
Qty: 28 TABLET | Refills: 0 | Status: SHIPPED | OUTPATIENT
Start: 2025-05-11 | End: 2025-05-18

## 2025-05-11 RX ORDER — METHOCARBAMOL 750 MG/1
750 TABLET, FILM COATED ORAL
Status: DISCONTINUED | OUTPATIENT
Start: 2025-05-11 | End: 2025-05-11

## 2025-05-11 RX ORDER — ACETAMINOPHEN 500 MG
1000 TABLET ORAL EVERY 6 HOURS PRN
Qty: 56 TABLET | Refills: 0 | Status: SHIPPED | OUTPATIENT
Start: 2025-05-11 | End: 2025-05-18

## 2025-05-11 RX ADMIN — NAPROXEN 500 MG: 250 TABLET ORAL at 16:17

## 2025-05-11 ASSESSMENT — PAIN DESCRIPTION - DESCRIPTORS: DESCRIPTORS: ACHING

## 2025-05-11 ASSESSMENT — PAIN DESCRIPTION - LOCATION: LOCATION: BACK

## 2025-05-11 ASSESSMENT — PAIN SCALES - GENERAL
PAINLEVEL_OUTOF10: 2
PAINLEVEL_OUTOF10: 0

## 2025-05-11 ASSESSMENT — PAIN - FUNCTIONAL ASSESSMENT
PAIN_FUNCTIONAL_ASSESSMENT: 0-10
PAIN_FUNCTIONAL_ASSESSMENT: ACTIVITIES ARE NOT PREVENTED

## 2025-05-11 ASSESSMENT — PAIN DESCRIPTION - PAIN TYPE: TYPE: ACUTE PAIN

## 2025-05-11 ASSESSMENT — PAIN DESCRIPTION - ONSET: ONSET: ON-GOING

## 2025-05-11 ASSESSMENT — PAIN DESCRIPTION - FREQUENCY: FREQUENCY: CONTINUOUS

## 2025-05-11 ASSESSMENT — PAIN DESCRIPTION - ORIENTATION: ORIENTATION: LOWER

## 2025-05-11 NOTE — ED TRIAGE NOTES
Patient arrives ambulatory with cane with complaints of worsening back pain x 2 week. Denies trauma. Denies loss of bowel or bladder.     PMH spinal fusion, rods in back

## 2025-05-11 NOTE — ED PROVIDER NOTES
[facility] EMERGENCY DEPARTMENT  EMERGENCY DEPARTMENT ENCOUNTER      Pt Name: Gorge Holcomb  MRN: [id number]  Birthdate 1950  Date of evaluation: 5/11/2025  Provider: Tyree Kahn PA-C    CHIEF COMPLAINT       Chief Complaint   Patient presents with    Back Pain         HISTORY OF PRESENT ILLNESS   (Location/Symptom, Timing/Onset, Context/Setting, Quality, Duration, Modifying Factors, Severity)  Note limiting factors.   74 yo M PMHx lumbar spinal fusion presenting to ED for atraumatic acute on chronic lower back pain, not associated with urinary symptoms, abd pain, nor fevers.  Worse when getting up out of bed for the past week. Denies saddle paresthesias, paresthesias, loss of function, urinary or bowel dysfunction, fever, unexpected weight loss.            Review of External Medical Records:     Nursing Notes were reviewed.    REVIEW OF SYSTEMS    (2-9 systems for level 4, 10 or more for level 5)       Except as noted above the remainder of the review of systems was reviewed and negative.       PAST MEDICAL HISTORY     Past Medical History:   Diagnosis Date    Arthritis     Asthma     Basal cell carcinoma of back     Cancer (HCC)     SKIN CANCERS-BASAL    Chronic pain     back    Hypertension     Other ill-defined conditions(799.89)     spinal stensis    Other ill-defined conditions(799.89)     ruptured l3-4    Other ill-defined conditions(799.89)     ulcerative cholitis    Prostate cancer (HCC)     Ulcerative pancolitis without complication (HCC) 5/8/2018         SURGICAL HISTORY       Past Surgical History:   Procedure Laterality Date    COLONOSCOPY N/A 6/13/2018    COLONOSCOPY performed by William Smith MD at [facility] ENDOSCOPY    COLONOSCOPY N/A 2/5/2021    COLONOSCOPY   :- performed by Collin Francis MD at [facility] ENDOSCOPY    COLONOSCOPY      COLONOSCOPY N/A 3/12/2025    COLONOSCOPY performed by Collin Francis MD at [facility] ENDOSCOPY    HEENT      TONSILS AS A CHILD    HEENT      CATARACTS

## 2025-05-11 NOTE — DISCHARGE INSTRUCTIONS
Call your spine surgeon within 48 hours for close outpatient follow up.  I have included orthopedist in your discharge paperwork if you no longer have local surgeon.  Take medication as prescribed.  Return immediately if any new or worsening symptoms.  Thank you for allowing us to be a part of your care.      Take tylenol and ibuprofen in alternating doses every 3 hours to maximize their anti-inflammatory effects and best manage your pain. Keep 6 hours between doses of the same medication.    For example:   9a: Tylenol  12p: Ibuprofen  3p: Tylenol  6p: Ibuprofen  Etc.     Lidocaine patches are available over the counter at any pharmacy, ask the pharmacist for help locating them. The concentration of medication should be between 3-4%, and I recommend staying away from products containing menthol due to skin irritation. You can apply a patch for 12 hours, take it off for 12 hours, and repeat the next day.     You can also take the muscle relaxer, robaxin (methocarbamol), for pain not otherwise controlled by the above.

## (undated) DEVICE — CONNECTOR TBNG AUX H2O JET DISP FOR OLY 160/180 SER

## (undated) DEVICE — CATH IV AUTOGRD BC BLU 22GA 25 -- INSYTE

## (undated) DEVICE — AIRLIFE™ U/CONNECT-IT OXYGEN TUBING 7 FEET (2.1 M) CRUSH-RESISTANT OXYGEN TUBING, VINYL TIPPED: Brand: AIRLIFE™

## (undated) DEVICE — FORCEPS BX L240CM JAW DIA2.8MM L CAP W/ NDL MIC MESH TOOTH

## (undated) DEVICE — WRISTBAND ID AD W1XL11.5IN RED POLY ALRG PREPRINTED PERM

## (undated) DEVICE — NEEDLE HYPO 18GA L1.5IN PNK S STL HUB POLYPR SHLD REG BVL

## (undated) DEVICE — BAG BELONG PT PERS CLEAR HANDL

## (undated) DEVICE — BW-412T DISP COMBO CLEANING BRUSH: Brand: SINGLE USE COMBINATION CLEANING BRUSH

## (undated) DEVICE — CONTAINER SPEC 20 ML LID NEUT BUFF FORMALIN 10 % POLYPR STS

## (undated) DEVICE — 1200 GUARD II KIT W/5MM TUBE W/O VAC TUBE: Brand: GUARDIAN

## (undated) DEVICE — KIT IV STRT W CHLORAPREP PD 1ML

## (undated) DEVICE — ENDO CARRY-ON PROCEDURE KIT INCLUDES ENZYMATIC SPONGE, GAUZE, BIOHAZARD LABEL, TRAY, LUBRICANT, DIRTY SCOPE LABEL, WATER LABEL, TRAY, DRAWSTRING PAD, AND DEFENDO 4-PIECE KIT.: Brand: ENDO CARRY-ON PROCEDURE KIT

## (undated) DEVICE — SET ADMIN 16ML TBNG L100IN 2 Y INJ SITE IV PIGGY BK DISP

## (undated) DEVICE — SYRINGE MED 20ML STD CLR PLAS LUERLOCK TIP N CTRL DISP

## (undated) DEVICE — CANN NASAL O2 CAPNOGRAPHY AD -- FILTERLINE

## (undated) DEVICE — Z DISCONTINUED USE 2751540 TUBING IRRIG L10IN DISP PMP ENDOGATOR

## (undated) DEVICE — KENDALL RADIOLUCENT FOAM MONITORING ELECTRODE -RECTANGULAR SHAPE: Brand: KENDALL

## (undated) DEVICE — Z CONVERTED USE 2274299 CUFF BLD PRESSURE LNG MED AD 25-35 CM ARM FLEXIPORT DISP

## (undated) DEVICE — SOLIDIFIER FLUID 3000 CC ABSORB

## (undated) DEVICE — BAG SPEC BIOHZD LF 2MIL 6X10IN -- CONVERT TO ITEM 357326

## (undated) DEVICE — TUBING HYDR IRR --

## (undated) DEVICE — Z DISCONTINUED NO SUB IDED SET EXTN W/ 4 W STPCOCK M SPIN LOK 36IN

## (undated) DEVICE — NEONATAL-ADULT SPO2 SENSOR: Brand: NELLCOR

## (undated) DEVICE — TUBING IRRIG COMPATIBLE W ERBE MEDIVATOR PMP HYDR

## (undated) DEVICE — SUPPLEMENT DIGESTIVE H2O SOL GI-EASE

## (undated) DEVICE — QUILTED PREMIUM COMFORT UNDERPAD,EXTRA HEAVY: Brand: WINGS